# Patient Record
Sex: FEMALE | Race: WHITE | NOT HISPANIC OR LATINO | Employment: OTHER | URBAN - METROPOLITAN AREA
[De-identification: names, ages, dates, MRNs, and addresses within clinical notes are randomized per-mention and may not be internally consistent; named-entity substitution may affect disease eponyms.]

---

## 2018-05-04 ENCOUNTER — HOSPITAL ENCOUNTER (OUTPATIENT)
Facility: HOSPITAL | Age: 83
Discharge: HOME | End: 2018-05-04
Attending: INTERNAL MEDICINE | Admitting: INTERNAL MEDICINE
Payer: MEDICARE

## 2018-05-04 VITALS
TEMPERATURE: 98 F | SYSTOLIC BLOOD PRESSURE: 120 MMHG | RESPIRATION RATE: 17 BRPM | HEART RATE: 86 BPM | OXYGEN SATURATION: 88 % | DIASTOLIC BLOOD PRESSURE: 63 MMHG

## 2018-05-04 DIAGNOSIS — E87.1 HYPONATREMIA: ICD-10-CM

## 2018-05-04 DIAGNOSIS — R79.89 ELEVATED TROPONIN: Primary | ICD-10-CM

## 2018-05-04 PROBLEM — M54.6 ACUTE THORACIC BACK PAIN: Status: ACTIVE | Noted: 2018-05-04

## 2018-05-04 LAB
ALBUMIN SERPL BCP-MCNC: 3.9 G/DL
ALP SERPL-CCNC: 59 U/L
ALT SERPL W/O P-5'-P-CCNC: 10 U/L
ANION GAP SERPL CALC-SCNC: 11 MMOL/L
AST SERPL-CCNC: 20 U/L
BACTERIA #/AREA URNS HPF: ABNORMAL /HPF
BASOPHILS # BLD AUTO: 0.03 K/UL
BASOPHILS NFR BLD: 0.4 %
BILIRUB SERPL-MCNC: 0.8 MG/DL
BILIRUB UR QL STRIP: NEGATIVE
BUN SERPL-MCNC: 12 MG/DL
CALCIUM SERPL-MCNC: 9.5 MG/DL
CHLORIDE SERPL-SCNC: 100 MMOL/L
CLARITY UR: CLEAR
CO2 SERPL-SCNC: 23 MMOL/L
COLOR UR: YELLOW
CREAT SERPL-MCNC: 1.2 MG/DL
DIASTOLIC DYSFUNCTION: NO
DIFFERENTIAL METHOD: ABNORMAL
EOSINOPHIL # BLD AUTO: 0.2 K/UL
EOSINOPHIL NFR BLD: 2.1 %
ERYTHROCYTE [DISTWIDTH] IN BLOOD BY AUTOMATED COUNT: 13.8 %
EST. GFR  (AFRICAN AMERICAN): 47 ML/MIN/1.73 M^2
EST. GFR  (NON AFRICAN AMERICAN): 41 ML/MIN/1.73 M^2
ESTIMATED PA SYSTOLIC PRESSURE: 20.14
GLUCOSE SERPL-MCNC: 97 MG/DL
GLUCOSE UR QL STRIP: NEGATIVE
HCT VFR BLD AUTO: 37.3 %
HGB BLD-MCNC: 12.6 G/DL
HGB UR QL STRIP: ABNORMAL
HYALINE CASTS #/AREA URNS LPF: 0 /LPF
KETONES UR QL STRIP: NEGATIVE
LEUKOCYTE ESTERASE UR QL STRIP: NEGATIVE
LYMPHOCYTES # BLD AUTO: 1.3 K/UL
LYMPHOCYTES NFR BLD: 18.8 %
MCH RBC QN AUTO: 30.7 PG
MCHC RBC AUTO-ENTMCNC: 33.8 G/DL
MCV RBC AUTO: 91 FL
MICROSCOPIC COMMENT: ABNORMAL
MONOCYTES # BLD AUTO: 0.5 K/UL
MONOCYTES NFR BLD: 7.1 %
NEUTROPHILS # BLD AUTO: 5 K/UL
NEUTROPHILS NFR BLD: 71.6 %
NITRITE UR QL STRIP: NEGATIVE
PH UR STRIP: 7 [PH] (ref 5–8)
PLATELET # BLD AUTO: 171 K/UL
PMV BLD AUTO: 9 FL
POTASSIUM SERPL-SCNC: 4 MMOL/L
PROT SERPL-MCNC: 6.9 G/DL
PROT UR QL STRIP: ABNORMAL
RBC # BLD AUTO: 4.11 M/UL
RBC #/AREA URNS HPF: 1 /HPF (ref 0–4)
RETIRED EF AND QEF - SEE NOTES: 60 (ref 55–65)
SODIUM SERPL-SCNC: 134 MMOL/L
SP GR UR STRIP: 1.01 (ref 1–1.03)
TROPONIN I SERPL DL<=0.01 NG/ML-MCNC: 0.03 NG/ML
TROPONIN I SERPL DL<=0.01 NG/ML-MCNC: 0.04 NG/ML
URN SPEC COLLECT METH UR: ABNORMAL
UROBILINOGEN UR STRIP-ACNC: NEGATIVE EU/DL
WBC # BLD AUTO: 7.01 K/UL
WBC #/AREA URNS HPF: 1 /HPF (ref 0–5)

## 2018-05-04 PROCEDURE — 99285 EMERGENCY DEPT VISIT HI MDM: CPT | Mod: 25

## 2018-05-04 PROCEDURE — 93005 ELECTROCARDIOGRAM TRACING: CPT

## 2018-05-04 PROCEDURE — 63600175 PHARM REV CODE 636 W HCPCS: Performed by: PHYSICIAN ASSISTANT

## 2018-05-04 PROCEDURE — 93306 TTE W/DOPPLER COMPLETE: CPT

## 2018-05-04 PROCEDURE — G0378 HOSPITAL OBSERVATION PER HR: HCPCS

## 2018-05-04 PROCEDURE — 84484 ASSAY OF TROPONIN QUANT: CPT

## 2018-05-04 PROCEDURE — 96374 THER/PROPH/DIAG INJ IV PUSH: CPT | Mod: 59

## 2018-05-04 PROCEDURE — 81000 URINALYSIS NONAUTO W/SCOPE: CPT

## 2018-05-04 PROCEDURE — 93306 TTE W/DOPPLER COMPLETE: CPT | Mod: 26,,, | Performed by: INTERNAL MEDICINE

## 2018-05-04 PROCEDURE — 80053 COMPREHEN METABOLIC PANEL: CPT

## 2018-05-04 PROCEDURE — 85025 COMPLETE CBC W/AUTO DIFF WBC: CPT

## 2018-05-04 PROCEDURE — 99203 OFFICE O/P NEW LOW 30 MIN: CPT | Mod: ,,, | Performed by: INTERNAL MEDICINE

## 2018-05-04 PROCEDURE — 93010 ELECTROCARDIOGRAM REPORT: CPT | Mod: ,,, | Performed by: INTERNAL MEDICINE

## 2018-05-04 PROCEDURE — 25000003 PHARM REV CODE 250: Performed by: PHYSICIAN ASSISTANT

## 2018-05-04 RX ORDER — ONDANSETRON 8 MG/1
8 TABLET, ORALLY DISINTEGRATING ORAL EVERY 8 HOURS PRN
Status: DISCONTINUED | OUTPATIENT
Start: 2018-05-04 | End: 2018-05-04 | Stop reason: HOSPADM

## 2018-05-04 RX ORDER — SODIUM CHLORIDE 9 MG/ML
INJECTION, SOLUTION INTRAVENOUS CONTINUOUS
Status: DISCONTINUED | OUTPATIENT
Start: 2018-05-04 | End: 2018-05-04 | Stop reason: HOSPADM

## 2018-05-04 RX ORDER — ONDANSETRON 4 MG/1
4 TABLET, ORALLY DISINTEGRATING ORAL
Status: COMPLETED | OUTPATIENT
Start: 2018-05-04 | End: 2018-05-04

## 2018-05-04 RX ORDER — TRAMADOL HYDROCHLORIDE 50 MG/1
50 TABLET ORAL EVERY 6 HOURS PRN
Status: DISCONTINUED | OUTPATIENT
Start: 2018-05-04 | End: 2018-05-04 | Stop reason: HOSPADM

## 2018-05-04 RX ORDER — METOPROLOL TARTRATE 50 MG/1
50 TABLET ORAL
Status: DISCONTINUED | OUTPATIENT
Start: 2018-05-04 | End: 2018-05-04

## 2018-05-04 RX ORDER — TRAMADOL HYDROCHLORIDE 100 MG/1
1 CAPSULE ORAL 2 TIMES DAILY
COMMUNITY
End: 2018-06-11

## 2018-05-04 RX ORDER — ASPIRIN 325 MG
325 TABLET, DELAYED RELEASE (ENTERIC COATED) ORAL
Status: COMPLETED | OUTPATIENT
Start: 2018-05-04 | End: 2018-05-04

## 2018-05-04 RX ORDER — MORPHINE SULFATE 4 MG/ML
4 INJECTION, SOLUTION INTRAMUSCULAR; INTRAVENOUS
Status: COMPLETED | OUTPATIENT
Start: 2018-05-04 | End: 2018-05-04

## 2018-05-04 RX ADMIN — ONDANSETRON 4 MG: 4 TABLET, ORALLY DISINTEGRATING ORAL at 12:05

## 2018-05-04 RX ADMIN — ASPIRIN 325 MG: 325 TABLET, DELAYED RELEASE ORAL at 09:05

## 2018-05-04 RX ADMIN — MORPHINE SULFATE 4 MG: 4 INJECTION INTRAVENOUS at 12:05

## 2018-05-04 NOTE — CONSULTS
[facility]  Cardiology  Consult Note    Patient Name: Tessie Champion  MRN: [id number]  Admission Date: 5/4/2018  Hospital Length of Stay: 0 days  Code Status: No Order   Attending Provider: Magdiel Ferrell MD   Consulting Provider: Gogo Matthew PA-C  Primary Care Physician: Joe Collazo MD  Principal Problem:<principal problem not specified>    Patient information was obtained from patient and ER records.     Inpatient consult to Cardiology  Consult performed by: GOGO MATTHEW  Consult ordered by: TENISHA MORRIS        Subjective:     Chief Complaint:  Arm/back pain    HPI:   Ms. Champion is an 86 year old female patient with a PMHx of former tobacco abuse and HTN who presented to [facility] ED today with a chief complaint of right upper back pain and arm pain that began earlier this AM after bending down to  her pen off the floor. Patient reported the pain was severe and radiated from her neck down toward her hand. Associated symptoms included numbness and tingling of her right hand. Patient denied any associated chest pain, SOB, palpitations, near syncope, or syncope. Initial workup in ED revealed troponin of 0.031>0.037 and patient subsequently admitted for further evaluation and treatment/ACS rule out. Cardiology consulted to assist with management. Patient seen and examined today, resting in bed. Still reports right arm and upper back pain with movement but states it has improved. No other complaints. Denies any chest pain. No prior cardiac history. Patient reports compliance with her medications. Chart reviewed. Repeat troponin and 2D echo pending. EKG reviewed and showed non-specific T wave abnormality, no acute ischemic changes. CT of spine reviewed-age indeterminate fracture of T11. CXR negative.     Past Medical History:   Diagnosis Date    Hypertension        No past surgical history on file.    Review of patient's allergies indicates:   Allergen Reactions     Pcn [penicillins]     Sulfa (sulfonamide antibiotics)        No current facility-administered medications on file prior to encounter.      No current outpatient prescriptions on file prior to encounter.     Family History     None        Social History Main Topics    Smoking status: Not on file    Smokeless tobacco: Not on file    Alcohol use Not on file    Drug use: Unknown    Sexual activity: Not on file     Review of Systems   Constitution: Negative.   HENT: Negative.    Eyes: Negative.    Cardiovascular: Negative.    Respiratory: Negative.    Endocrine: Negative.    Hematologic/Lymphatic: Negative.    Skin: Negative.    Musculoskeletal: Positive for arthritis and joint pain.   Gastrointestinal: Negative.    Genitourinary: Negative.    Neurological: Negative.    Psychiatric/Behavioral: Negative.    Allergic/Immunologic: Negative.      Objective:     Vital Signs (Most Recent):  Temp: 98.1 °F (36.7 °C) (05/04/18 0808)  Pulse: 74 (05/04/18 1400)  Resp: 18 (05/04/18 1040)  BP: (!) 95/55 (05/04/18 1400)  SpO2: 99 % (05/04/18 1400) Vital Signs (24h Range):  Temp:  [98.1 °F (36.7 °C)] 98.1 °F (36.7 °C)  Pulse:  [59-79] 74  Resp:  [18] 18  SpO2:  [97 %-100 %] 99 %  BP: ()/(55-90) 95/55        There is no height or weight on file to calculate BMI.    SpO2: 99 %  O2 Device (Oxygen Therapy): room air    No intake or output data in the 24 hours ending 05/04/18 1501    Lines/Drains/Airways     Peripheral Intravenous Line                 Peripheral IV - Single Lumen 05/04/18 0832 Right Antecubital less than 1 day                Physical Exam   Constitutional: She is oriented to person, place, and time. She appears well-developed and well-nourished. No distress.   HENT:   Head: Normocephalic and atraumatic.   Eyes: Pupils are equal, round, and reactive to light. Right eye exhibits no discharge. Left eye exhibits no discharge.   Neck: Neck supple. No JVD present.   Cardiovascular: Normal rate, regular rhythm, S1  normal, S2 normal and normal heart sounds.    No murmur heard.  Pulmonary/Chest: Effort normal and breath sounds normal. No respiratory distress. She has no wheezes. She has no rales.   Abdominal: Soft. She exhibits no distension. There is no tenderness. There is no rebound.   Musculoskeletal: She exhibits tenderness (upper right back and right arm). She exhibits no edema.   Neurological: She is alert and oriented to person, place, and time.   Skin: Skin is warm and dry. She is not diaphoretic. No erythema.   Psychiatric: She has a normal mood and affect. Her behavior is normal. Thought content normal.   Nursing note and vitals reviewed.      Significant Labs:   CMP   Recent Labs  Lab 05/04/18  0832   *   K 4.0      CO2 23   GLU 97   BUN 12   CREATININE 1.2   CALCIUM 9.5   PROT 6.9   ALBUMIN 3.9   BILITOT 0.8   ALKPHOS 59   AST 20   ALT 10   ANIONGAP 11   ESTGFRAFRICA 47*   EGFRNONAA 41*   , CBC   Recent Labs  Lab 05/04/18  0832   WBC 7.01   HGB 12.6   HCT 37.3      , Troponin   Recent Labs  Lab 05/04/18  0832 05/04/18  1155   TROPONINI 0.031* 0.037*    and All pertinent lab results from the last 24 hours have been reviewed.    Significant Imaging: Echocardiogram: 2D echo with color flow doppler: No results found for this or any previous visit. and X-Ray: CXR: X-Ray Chest 1 View (CXR): No results found for this visit on 05/04/18. and X-Ray Chest PA and Lateral (CXR):   Results for orders placed or performed during the hospital encounter of 05/04/18   X-Ray Chest PA And Lateral    Narrative    EXAMINATION:  XR CHEST PA AND LATERAL    CLINICAL HISTORY:  Chest Pain;    COMPARISON:  None    FINDINGS:  There is mild cardiomegaly.  There is no evidence of an acute pulmonary process.  There is a moderate amount of atherosclerosis.  There is no pneumothorax or pleural effusion.  There is diffuse bony demineralization.  There are mild degenerative changes in the thoracic spine.      Impression    1. There  is no evidence of an acute pulmonary process.  2. There is mild cardiomegaly.  3. There is a moderate amount of atherosclerosis.  4. Osteopenia versus osteoporosis  5. There are mild degenerative changes in the thoracic spine.  .      Electronically signed by: Frankie Rodríguez MD  Date:    05/04/2018  Time:    08:51     Assessment and Plan:   Patient who presents with back and arm pain, MSK in nature, reproducible on exam. Unclear etiology of elevated troponin, however she is not having any active cardiac symptoms. Check 2D echo. If troponin and echo are stable, can f/u as OP.    Acute thoracic back pain    -Secondary to strain/injury  -Symptomatic relief        Elevated troponin    -Troponin 0.031>0.037  -Repeat pending  -Unclear etiology of elevation however patient is not having any angina or anginal equivalent  -Presentation consistent with MSK pain/strain  -EKG without any ischemic changes  -2D echo pending  -If echo negative and repeat troponin in similar range, can be d/c'd with OP f/u  -Can start ASA if no contraindications            VTE Risk Mitigation     None          Thank you for your consult. I will follow-up with patient. Please contact us if you have any additional questions.    Gogo Gallagher PA-C  Cardiology   [facility]    Chart reviewed. Dr. Woodall examined patient and agrees with plan as outlined above.

## 2018-05-04 NOTE — ASSESSMENT & PLAN NOTE
-Care Everywhere reviewed.  Patient has a history of lumbar disc disease. Was previously on Tramadol.   -CT reviewed. Shows osteoarthritic changes, compression fracture, and Osteophytes.   -MRI offered, but patient declined. Voiced concern for possible cord compression. Verbalized understanding, but again declined. Will consider MRI outpatient.   -pain management.

## 2018-05-04 NOTE — H&P
[facility] Hospital Medicine  History & Physical    Patient Name: Tessie Champion  MRN: [id number]  Admission Date: 5/4/2018  Attending Physician: Magdiel Ferrell MD   Primary Care Provider: Joe Collazo MD    Patient information was obtained from patient and ER records.     Subjective:     Principal Problem:<principal problem not specified>    Chief Complaint:   Chief Complaint   Patient presents with    Arm Pain     Reports reaching down and having R shoulder/arm pain with hand numbness that has since resolved.         HPI: Tessie Champion is a 86 year old female with history of Hypertension and lumbar back pain who presents to ED for further evaluation of right arm pain that acutely began today. Pain radiated to upper back pain(across scapula)and neck. Other associated symptoms include tingling involving 2 digits on right hand. Patient denies chest pain and dyspnea. CT Head was negative. CT Cervical and Thoracic spine showed osteoarthritis changes, compression fracture involving T11 and L1. MRI was recommended, but patient declined. Other work up revealed elevated Troponin 0.031>>0.037. EKG showed nonspecific ST changes.  She has subsequently been admitted to observation for further work up.        Past Medical History:   Diagnosis Date    Hypertension  Lumbar Disc Disease  Hypothyroidism  CKD III        Surgical History  Reviewed. Not pertinent.     Review of patient's allergies indicates:   Allergen Reactions    Pcn [penicillins]     Sulfa (sulfonamide antibiotics)        No current facility-administered medications on file prior to encounter.      No current outpatient prescriptions on file prior to encounter.     Family History     Reviewed. Not Pertinent.        Social History Main Topics    Smoking status: Not on file    Smokeless tobacco: Not on file    Alcohol use Not on file    Drug use: Unknown    Sexual activity: Not on file     Review of Systems   Constitutional:  Negative for activity change, diaphoresis, fatigue and unexpected weight change.   HENT: Negative for congestion, ear pain and sore throat.    Eyes: Negative.    Respiratory: Negative for shortness of breath and wheezing.    Cardiovascular: Negative for chest pain and palpitations.   Gastrointestinal: Negative for abdominal pain, constipation, diarrhea and vomiting.   Endocrine: Negative.    Genitourinary: Negative for flank pain, hematuria and urgency.   Musculoskeletal: Positive for back pain, myalgias and neck pain. Negative for joint swelling.   Skin: Negative for pallor.   Neurological: Positive for numbness. Negative for seizures, syncope and light-headedness.   Hematological: Negative.    Psychiatric/Behavioral: Negative.      Objective:     Vital Signs (Most Recent):  Temp: 98.1 °F (36.7 °C) (05/04/18 0808)  Pulse: 74 (05/04/18 1400)  Resp: 18 (05/04/18 1040)  BP: (!) 95/55 (05/04/18 1400)  SpO2: 99 % (05/04/18 1400) Vital Signs (24h Range):  Temp:  [98.1 °F (36.7 °C)] 98.1 °F (36.7 °C)  Pulse:  [59-79] 74  Resp:  [18] 18  SpO2:  [97 %-100 %] 99 %  BP: ()/(55-90) 95/55        There is no height or weight on file to calculate BMI.    Physical Exam   Constitutional: She is oriented to person, place, and time. She appears well-developed and well-nourished.   Elderly.    HENT:   Head: Normocephalic and atraumatic.   Eyes: EOM are normal.   Neck: Normal range of motion. Neck supple.   Cardiovascular: Normal rate, regular rhythm and normal heart sounds.    No murmur heard.  Pulmonary/Chest: Effort normal and breath sounds normal. No respiratory distress.   Abdominal: Soft. Bowel sounds are normal. She exhibits no distension. There is no tenderness.   Musculoskeletal: Normal range of motion. She exhibits no edema.   5/5 muscle strength.    Neurological: She is alert and oriented to person, place, and time.   Skin: Skin is warm and dry.         CRANIAL NERVES     CN III, IV, VI   Extraocular motions are  normal.        Significant Labs:   CBC:   Recent Labs  Lab 05/04/18  0832   WBC 7.01   HGB 12.6   HCT 37.3        CMP:   Recent Labs  Lab 05/04/18  0832   *   K 4.0      CO2 23   GLU 97   BUN 12   CREATININE 1.2   CALCIUM 9.5   PROT 6.9   ALBUMIN 3.9   BILITOT 0.8   ALKPHOS 59   AST 20   ALT 10   ANIONGAP 11   EGFRNONAA 41*       Significant Imaging:   Imaging Results          CT Thoracic Spine Without Contrast (Final result)  Result time 05/04/18 09:43:53    Final result by BRAYAN Rodríguez Sr., MD (05/04/18 09:43:53)                 Impression:      1. There is an age-indeterminate mild compression fracture of T11. There is an age indeterminate fracture of the L1 vertebral body.  2. There are small posterior osteophytes between T6 and T7. There are small posterior osteophytes between T9 and T10.  3. There is a marked amount of atherosclerosis.  4. If additional imaging evaluation is clinically indicated, I recommend consideration of an MRI examination of the thoracic spine without IV contrast.  All CT scans at this facility use dose modulation, iterative reconstruction, and/or weight base dosing when appropriate to reduce radiation dose when appropriate to reduce radiation dose to as low as reasonably achievable.      Electronically signed by: Frankie Rodríguez MD  Date:    05/04/2018  Time:    09:43             Narrative:    EXAMINATION:  CT THORACIC SPINE WITHOUT CONTRAST    CLINICAL HISTORY:  Mid-back/thoracic spine pain, first study;    TECHNIQUE:  Standard thoracic spine CT protocol was performed without IV contrast.    COMPARISON:  Plain film examination of the lumbar spine performed on 03/15/2010.    FINDINGS:  There is an age-indeterminate mild compression fracture of T11.  There is an age indeterminate fracture of the L1 vertebral body. There is no spondylolisthesis or scoliosis of the thoracic spine.  There is normal thoracic kyphosis.  There is a marked amount of atherosclerosis.  There  are small posterior osteophytes between T6 and T7. There are small posterior osteophytes between T9 and T10.                               CT CERVICAL SPINE WITHOUT CONTRAST (Final result)  Result time 05/04/18 09:33:06    Final result by BRAYAN Rodríguez Sr., MD (05/04/18 09:33:06)                 Impression:      1. There are severe osteoarthritic changes in the facet joints bilaterally between C2 and C5.  2. There are mild degenerative changes between C5 and C7.  3. There is grade 1 anterolisthesis of C3 on C4. There is grade 1 anterolisthesis of C4 on C5.  4. There is straightening of the normal cervical lordosis.  All CT scans at this facility use dose modulation, iterative reconstruction, and/or weight base dosing when appropriate to reduce radiation dose when appropriate to reduce radiation dose to as low as reasonably achievable.      Electronically signed by: Frankie Rodríguez MD  Date:    05/04/2018  Time:    09:33             Narrative:    EXAMINATION:  CT CERVICAL SPINE WITHOUT CONTRAST    CLINICAL HISTORY:  right hand numbness;    TECHNIQUE:  Standard cervical spine CT protocol was performed without IV contrast.    COMPARISON:  Head CT performed on 05/04/2018.    FINDINGS:  There is straightening of the normal cervical lordosis.  There is grade 1 anterolisthesis of C3 on C4.  There is grade 1 anterolisthesis of C4 on C5.  There is no fracture or scoliosis.  There are mild degenerative changes between C5 and C7.  There are severe osteoarthritic changes in the facet joints bilaterally between C2 and C5.  There is a moderate amount of atherosclerosis.                               CT Head Without Contrast (Final result)  Result time 05/04/18 09:26:56    Final result by BRAYAN Rodríguez Sr., MD (05/04/18 09:26:56)                 Impression:      1. There is no evidence of an acute ischemic event.  2. There is no intracranial hemorrhage.  3. There is mild partial opacification of the right ethmoidal and left  sphenoidal sinuses.  This is characteristic of sinusitis.  All CT scans at this facility use dose modulation, iterative reconstruction, and/or weight base dosing when appropriate to reduce radiation dose when appropriate to reduce radiation dose to as low as reasonably achievable.      Electronically signed by: Frankie Rodríguez MD  Date:    05/04/2018  Time:    09:26             Narrative:    EXAMINATION:  CT HEAD WITHOUT CONTRAST    CLINICAL HISTORY:  right hand numbness;    TECHNIQUE:  Standard brain CT protocol without IV contrast was performed.    COMPARISON:  None    FINDINGS:  There are moderate bilateral periventricular ischemic white matter changes.  There are chronic appearing ischemic changes in the basal ganglia.  There is no evidence of an acute ischemic event.  There is no intracranial hemorrhage.  There is no skull fracture.  There is mild partial opacification of the right ethmoidal and left sphenoidal sinuses.                               X-Ray Chest PA And Lateral (Final result)  Result time 05/04/18 08:51:22    Final result by BRAYAN Rodríguez Sr., MD (05/04/18 08:51:22)                 Impression:      1. There is no evidence of an acute pulmonary process.  2. There is mild cardiomegaly.  3. There is a moderate amount of atherosclerosis.  4. Osteopenia versus osteoporosis  5. There are mild degenerative changes in the thoracic spine.  .      Electronically signed by: Frankie Rodríguez MD  Date:    05/04/2018  Time:    08:51             Narrative:    EXAMINATION:  XR CHEST PA AND LATERAL    CLINICAL HISTORY:  Chest Pain;    COMPARISON:  None    FINDINGS:  There is mild cardiomegaly.  There is no evidence of an acute pulmonary process.  There is a moderate amount of atherosclerosis.  There is no pneumothorax or pleural effusion.  There is diffuse bony demineralization.  There are mild degenerative changes in the thoracic spine.                                  Assessment/Plan:     Acute thoracic back  pain    -Care Everywhere reviewed.  Patient has a history of lumbar disc disease. Was previously on Tramadol.   -CT reviewed. Shows osteoarthritic changes, compression fracture, and Osteophytes.   -MRI offered, but patient declined. Voiced concern for possible cord compression. Verbalized understanding, but again declined. Will consider MRI outpatient.   -pain management.         Elevated troponin    -No complaints of chest pain or anginal equivalent.   -baseline unknown.   -Cardiology input appreciated. No evidence of dissection as well.   -will follow Echocardiogram, if negative will discharge and follow up outpatient with Cardiology.   -agree with initiation of ASA.               VTE Risk Mitigation     Megan Ventura NP  Department of Hospital Medicine   [facility]

## 2018-05-04 NOTE — DISCHARGE SUMMARY
[facility] Hospital Medicine  Discharge Summary      Patient Name: Tessie Champion  MRN: [id number]  Admission Date: 5/4/2018  Hospital Length of Stay: 0 days  Discharge Date and Time:  05/04/2018 4:43 PM  Attending Physician: Magdiel Ferrell MD   Discharging Provider: Cheng Ventura NP  Primary Care Provider: Joe Collazo MD      HPI:   Tessie Champion is a 86 year old female with history of Hypertension and lumbar back pain who presents to ED for further evaluation of right arm pain that acutely began today. Pain radiated to upper back pain(across scapula)and neck. Other associated symptoms include tingling involving 2 digits on right hand. Patient denies chest pain and dyspnea. CT Head was negative. CT Cervical and Thoracic spine showed osteoarthritis changes, compression fracture involving T11 and L1. MRI was recommended, but patient declined. Other work up revealed elevated Troponin 0.031>>0.037. EKG showed nonspecific ST changes.  She has subsequently been admitted to observation for further work up.        * No surgery found *      Hospital Course:   Tessie Champion is a 86 year old female who was admitted to [facility] for further evaluation of right arm pain and back pain. CT shows osteoarthritic changes and compression changes involving vertebrae. MRI was recommended, but patient declined. She will consider MRI outpatient. She was asked to continue Tramadol prn for pain. She was noted with troponin elevation and was seen by Cardiology. She denied chest pain and other anginal equivalent. Echocardiogram did not show WMA or another abnormality. Etiology of troponin elevation unknown at this time. She will need to follow up with Cardiology in one week for full work up. (Appt made with Dr. Arce 5/8/2018). Patient seen and examined on date of discharge and deemed suitable.      Consults:   Consults         Status Ordering Provider     Inpatient consult to Cardiology   Once     Provider:  (Not yet assigned)    Completed TENISHA MORRIS new Assessment & Plan notes have been filed under this hospital service since the last note was generated.  Service: Hospital Medicine    Final Active Diagnoses:    Diagnosis Date Noted POA    PRINCIPAL PROBLEM:  Acute thoracic back pain [M54.6] 05/04/2018 Unknown    Elevated troponin [R74.8] 05/04/2018 Yes      Problems Resolved During this Admission:    Diagnosis Date Noted Date Resolved POA       Discharged Condition: stable    Disposition: Home or Self Care    Follow Up:  Follow-up Information     Nick Arce Md, MD On 5/8/2018.    Specialties:  Cardiology, Internal Medicine  Contact information:  [address]  [place] [address] [phone]                 Patient Instructions:   No discharge procedures on file.    Significant Diagnostic Studies: Labs:   CMP   Recent Labs  Lab 05/04/18  0832   *   K 4.0      CO2 23   GLU 97   BUN 12   CREATININE 1.2   CALCIUM 9.5   PROT 6.9   ALBUMIN 3.9   BILITOT 0.8   ALKPHOS 59   AST 20   ALT 10   ANIONGAP 11   ESTGFRAFRICA 47*   EGFRNONAA 41*    and CBC   Recent Labs  Lab 05/04/18  0832   WBC 7.01   HGB 12.6   HCT 37.3          Pending Diagnostic Studies:     None         Medications:  Reconciled Home Medications:      Medication List      CONTINUE taking these medications    traMADol 100 mg Mp25  Take by mouth.            Indwelling Lines/Drains at time of discharge:   Lines/Drains/Airways          No matching active lines, drains, or airways          Time spent on the discharge of patient: >#35 minutes  Patient was seen and examined on the date of discharge and determined to be suitable for discharge.      Cheng Ventura NP  Department of Hospital Medicine  [facility]

## 2018-05-04 NOTE — ED PROVIDER NOTES
Encounter Date: 5/4/2018       History     Chief Complaint   Patient presents with    Arm Pain     Reports reaching down and having R shoulder/arm pain with hand numbness that has since resolved.      The history is provided by the patient.   Arm Pain   This is a new problem. The current episode started 3 to 5 hours ago. The problem occurs rarely. The problem has been gradually improving. Pertinent negatives include no chest pain, no abdominal pain, no headaches and no shortness of breath. Associated symptoms comments: Right hand paresthesias  . Exacerbated by: movement and use of arm. Nothing relieves the symptoms. She has tried nothing for the symptoms.     Review of patient's allergies indicates:   Allergen Reactions    Pcn [penicillins]     Sulfa (sulfonamide antibiotics)      Past Medical History:   Diagnosis Date    Hypertension      No past surgical history on file.  History reviewed. No pertinent family history.  Social History   Substance Use Topics    Smoking status: Not on file    Smokeless tobacco: Not on file    Alcohol use Not on file     Review of Systems   Constitutional: Negative for chills and fever.   HENT: Negative for sore throat.    Eyes: Negative for photophobia and redness.   Respiratory: Negative for cough and shortness of breath.    Cardiovascular: Negative for chest pain.   Gastrointestinal: Negative for abdominal pain, diarrhea and nausea.   Endocrine: Negative for polydipsia and polyphagia.   Genitourinary: Negative for dysuria.   Musculoskeletal: Negative for arthralgias, back pain and myalgias.   Skin: Negative for rash.   Neurological: Negative for weakness and headaches.   Hematological: Does not bruise/bleed easily.   Psychiatric/Behavioral: The patient is not nervous/anxious.    All other systems reviewed and are negative.      Physical Exam     Initial Vitals [05/04/18 0808]   BP Pulse Resp Temp SpO2   (!) 119/90 (!) 59 18 98.1 °F (36.7 °C) 99 %      MAP       99.67          Physical Exam    Nursing note and vitals reviewed.  Constitutional: Vital signs are normal. She appears well-developed and well-nourished. No distress.   HENT:   Head: Normocephalic and atraumatic.   Right Ear: External ear normal.   Left Ear: External ear normal.   Nose: Nose normal.   Mouth/Throat: Oropharynx is clear and moist.   Eyes: Conjunctivae, EOM and lids are normal. Pupils are equal, round, and reactive to light.   Neck: Normal range of motion and full passive range of motion without pain. Neck supple.   Cardiovascular: Normal rate, regular rhythm, S1 normal, S2 normal, normal heart sounds, intact distal pulses and normal pulses.   Pulmonary/Chest: Breath sounds normal. No respiratory distress. She has no wheezes. She has no rales.   Abdominal: Soft. Normal appearance and bowel sounds are normal. She exhibits no distension. There is no tenderness.   Musculoskeletal: Normal range of motion.   Lymphadenopathy:     She has no cervical adenopathy.   Neurological: She is alert and oriented to person, place, and time. She has normal strength. No cranial nerve deficit or sensory deficit. Coordination and gait normal.   Skin: Skin is warm, dry and intact.   Psychiatric: She has a normal mood and affect. Her speech is normal and behavior is normal. Judgment and thought content normal. Cognition and memory are normal.         ED Course   Procedures  Labs Reviewed   CBC W/ AUTO DIFFERENTIAL - Abnormal; Notable for the following:        Result Value    MPV 9.0 (*)     All other components within normal limits   COMPREHENSIVE METABOLIC PANEL - Abnormal; Notable for the following:     Sodium 134 (*)     eGFR if  47 (*)     eGFR if non  41 (*)     All other components within normal limits   URINALYSIS - Abnormal; Notable for the following:     Protein, UA 1+ (*)     Occult Blood UA Trace (*)     All other components within normal limits   TROPONIN I - Abnormal; Notable for the  following:     Troponin I 0.031 (*)     All other components within normal limits   TROPONIN I - Abnormal; Notable for the following:     Troponin I 0.037 (*)     All other components within normal limits   URINALYSIS MICROSCOPIC - Abnormal; Notable for the following:     Bacteria, UA Few (*)     All other components within normal limits             Medical Decision Making:   Other:   I have discussed this case with another health care provider.       <> Summary of the Discussion: Discussed case with Dr. Woodall who asks that we consult Hospital medicine for admission to determine cause of troponin elevation.  Discussed case with Ashley (Hospital Medicine) who agrees to admit the pt.                      Clinical Impression:   The primary encounter diagnosis was Elevated troponin. A diagnosis of Hyponatremia was also pertinent to this visit.    Disposition:   Disposition: Admitted  Condition: Stable                        FABRIZIO Beltran  05/04/18 1316       FABRIZIO Beltran  05/04/18 1316

## 2018-05-04 NOTE — HPI
Ms. Champion is an 86 year old female patient with a PMHx of former tobacco abuse and HTN who presented to [facility] ED today with a chief complaint of right upper back pain and arm pain that began earlier this AM after bending down to  her pen off the floor. Patient reported the pain was severe and radiated from her neck down toward her hand. Associated symptoms included numbness and tingling of her right hand. Patient denied any associated chest pain, SOB, palpitations, near syncope, or syncope. Initial workup in ED revealed troponin of 0.031>0.037 and patient subsequently admitted for further evaluation and treatment/ACS rule out. Cardiology consulted to assist with management. Patient seen and examined today, resting in bed. Still reports right arm and upper back pain with movement but states it has improved. No other complaints. Denies any chest pain. No prior cardiac history. Patient reports compliance with her medications. Chart reviewed. Repeat troponin and 2D echo pending. EKG reviewed and showed non-specific T wave abnormality, no acute ischemic changes. CT of spine reviewed-age indeterminate fracture of T11. CXR negative.

## 2018-05-04 NOTE — SUBJECTIVE & OBJECTIVE
Past Medical History:   Diagnosis Date    Hypertension        No past surgical history on file.    Review of patient's allergies indicates:   Allergen Reactions    Pcn [penicillins]     Sulfa (sulfonamide antibiotics)        No current facility-administered medications on file prior to encounter.      No current outpatient prescriptions on file prior to encounter.     Family History     None        Social History Main Topics    Smoking status: Not on file    Smokeless tobacco: Not on file    Alcohol use Not on file    Drug use: Unknown    Sexual activity: Not on file     Review of Systems   Constitution: Negative.   HENT: Negative.    Eyes: Negative.    Cardiovascular: Negative.    Respiratory: Negative.    Endocrine: Negative.    Hematologic/Lymphatic: Negative.    Skin: Negative.    Musculoskeletal: Positive for arthritis and joint pain.   Gastrointestinal: Negative.    Genitourinary: Negative.    Neurological: Negative.    Psychiatric/Behavioral: Negative.    Allergic/Immunologic: Negative.      Objective:     Vital Signs (Most Recent):  Temp: 98.1 °F (36.7 °C) (05/04/18 0808)  Pulse: 74 (05/04/18 1400)  Resp: 18 (05/04/18 1040)  BP: (!) 95/55 (05/04/18 1400)  SpO2: 99 % (05/04/18 1400) Vital Signs (24h Range):  Temp:  [98.1 °F (36.7 °C)] 98.1 °F (36.7 °C)  Pulse:  [59-79] 74  Resp:  [18] 18  SpO2:  [97 %-100 %] 99 %  BP: ()/(55-90) 95/55        There is no height or weight on file to calculate BMI.    SpO2: 99 %  O2 Device (Oxygen Therapy): room air    No intake or output data in the 24 hours ending 05/04/18 1501    Lines/Drains/Airways     Peripheral Intravenous Line                 Peripheral IV - Single Lumen 05/04/18 0832 Right Antecubital less than 1 day                Physical Exam   Constitutional: She is oriented to person, place, and time. She appears well-developed and well-nourished. No distress.   HENT:   Head: Normocephalic and atraumatic.   Eyes: Pupils are equal, round, and reactive  to light. Right eye exhibits no discharge. Left eye exhibits no discharge.   Neck: Neck supple. No JVD present.   Cardiovascular: Normal rate, regular rhythm, S1 normal, S2 normal and normal heart sounds.    No murmur heard.  Pulmonary/Chest: Effort normal and breath sounds normal. No respiratory distress. She has no wheezes. She has no rales.   Abdominal: Soft. She exhibits no distension. There is no tenderness. There is no rebound.   Musculoskeletal: She exhibits tenderness (upper right back and right arm). She exhibits no edema.   Neurological: She is alert and oriented to person, place, and time.   Skin: Skin is warm and dry. She is not diaphoretic. No erythema.   Psychiatric: She has a normal mood and affect. Her behavior is normal. Thought content normal.   Nursing note and vitals reviewed.      Significant Labs:   CMP   Recent Labs  Lab 05/04/18  0832   *   K 4.0      CO2 23   GLU 97   BUN 12   CREATININE 1.2   CALCIUM 9.5   PROT 6.9   ALBUMIN 3.9   BILITOT 0.8   ALKPHOS 59   AST 20   ALT 10   ANIONGAP 11   ESTGFRAFRICA 47*   EGFRNONAA 41*   , CBC   Recent Labs  Lab 05/04/18  0832   WBC 7.01   HGB 12.6   HCT 37.3      , Troponin   Recent Labs  Lab 05/04/18  0832 05/04/18  1155   TROPONINI 0.031* 0.037*    and All pertinent lab results from the last 24 hours have been reviewed.    Significant Imaging: Echocardiogram: 2D echo with color flow doppler: No results found for this or any previous visit. and X-Ray: CXR: X-Ray Chest 1 View (CXR): No results found for this visit on 05/04/18. and X-Ray Chest PA and Lateral (CXR):   Results for orders placed or performed during the hospital encounter of 05/04/18   X-Ray Chest PA And Lateral    Narrative    EXAMINATION:  XR CHEST PA AND LATERAL    CLINICAL HISTORY:  Chest Pain;    COMPARISON:  None    FINDINGS:  There is mild cardiomegaly.  There is no evidence of an acute pulmonary process.  There is a moderate amount of atherosclerosis.  There is no  pneumothorax or pleural effusion.  There is diffuse bony demineralization.  There are mild degenerative changes in the thoracic spine.      Impression    1. There is no evidence of an acute pulmonary process.  2. There is mild cardiomegaly.  3. There is a moderate amount of atherosclerosis.  4. Osteopenia versus osteoporosis  5. There are mild degenerative changes in the thoracic spine.  .      Electronically signed by: Frankie Rodríguez MD  Date:    05/04/2018  Time:    08:51

## 2018-05-04 NOTE — HPI
Tessie Champion is a 86 year old female with history of Hypertension and lumbar back pain who presents to ED for further evaluation of right arm pain that acutely began today. Pain radiated to upper back pain(across scapula)and neck. Other associated symptoms include tingling involving 2 digits on right hand. Patient denies chest pain and dyspnea. CT Head was negative. CT Cervical and Thoracic spine showed osteoarthritis changes, compression fracture involving T11 and L1. MRI was recommended, but patient declined. Other work up revealed elevated Troponin 0.031>>0.037. EKG showed nonspecific ST changes.  She has subsequently been admitted to observation for further work up.

## 2018-05-04 NOTE — SUBJECTIVE & OBJECTIVE
Past Medical History:   Diagnosis Date    Hypertension  Lumbar Disc Disease  Hypothyroidism  CKD III        Surgical History  Reviewed. Not pertinent.     Review of patient's allergies indicates:   Allergen Reactions    Pcn [penicillins]     Sulfa (sulfonamide antibiotics)        No current facility-administered medications on file prior to encounter.      No current outpatient prescriptions on file prior to encounter.     Family History     Reviewed. Not Pertinent.        Social History Main Topics    Smoking status: Not on file    Smokeless tobacco: Not on file    Alcohol use Not on file    Drug use: Unknown    Sexual activity: Not on file     Review of Systems   Constitutional: Negative for activity change, diaphoresis, fatigue and unexpected weight change.   HENT: Negative for congestion, ear pain and sore throat.    Eyes: Negative.    Respiratory: Negative for shortness of breath and wheezing.    Cardiovascular: Negative for chest pain and palpitations.   Gastrointestinal: Negative for abdominal pain, constipation, diarrhea and vomiting.   Endocrine: Negative.    Genitourinary: Negative for flank pain, hematuria and urgency.   Musculoskeletal: Positive for back pain, myalgias and neck pain. Negative for joint swelling.   Skin: Negative for pallor.   Neurological: Positive for numbness. Negative for seizures, syncope and light-headedness.   Hematological: Negative.    Psychiatric/Behavioral: Negative.      Objective:     Vital Signs (Most Recent):  Temp: 98.1 °F (36.7 °C) (05/04/18 0808)  Pulse: 74 (05/04/18 1400)  Resp: 18 (05/04/18 1040)  BP: (!) 95/55 (05/04/18 1400)  SpO2: 99 % (05/04/18 1400) Vital Signs (24h Range):  Temp:  [98.1 °F (36.7 °C)] 98.1 °F (36.7 °C)  Pulse:  [59-79] 74  Resp:  [18] 18  SpO2:  [97 %-100 %] 99 %  BP: ()/(55-90) 95/55        There is no height or weight on file to calculate BMI.    Physical Exam   Constitutional: She is oriented to person, place, and time. She  appears well-developed and well-nourished.   Elderly.    HENT:   Head: Normocephalic and atraumatic.   Eyes: EOM are normal.   Neck: Normal range of motion. Neck supple.   Cardiovascular: Normal rate, regular rhythm and normal heart sounds.    No murmur heard.  Pulmonary/Chest: Effort normal and breath sounds normal. No respiratory distress.   Abdominal: Soft. Bowel sounds are normal. She exhibits no distension. There is no tenderness.   Musculoskeletal: Normal range of motion. She exhibits no edema.   5/5 muscle strength.    Neurological: She is alert and oriented to person, place, and time.   Skin: Skin is warm and dry.         CRANIAL NERVES     CN III, IV, VI   Extraocular motions are normal.        Significant Labs:   CBC:   Recent Labs  Lab 05/04/18  0832   WBC 7.01   HGB 12.6   HCT 37.3        CMP:   Recent Labs  Lab 05/04/18  0832   *   K 4.0      CO2 23   GLU 97   BUN 12   CREATININE 1.2   CALCIUM 9.5   PROT 6.9   ALBUMIN 3.9   BILITOT 0.8   ALKPHOS 59   AST 20   ALT 10   ANIONGAP 11   EGFRNONAA 41*       Significant Imaging:   Imaging Results          CT Thoracic Spine Without Contrast (Final result)  Result time 05/04/18 09:43:53    Final result by BRAYAN Rodríguez Sr., MD (05/04/18 09:43:53)                 Impression:      1. There is an age-indeterminate mild compression fracture of T11. There is an age indeterminate fracture of the L1 vertebral body.  2. There are small posterior osteophytes between T6 and T7. There are small posterior osteophytes between T9 and T10.  3. There is a marked amount of atherosclerosis.  4. If additional imaging evaluation is clinically indicated, I recommend consideration of an MRI examination of the thoracic spine without IV contrast.  All CT scans at this facility use dose modulation, iterative reconstruction, and/or weight base dosing when appropriate to reduce radiation dose when appropriate to reduce radiation dose to as low as reasonably  achievable.      Electronically signed by: Frankie Rodríguez MD  Date:    05/04/2018  Time:    09:43             Narrative:    EXAMINATION:  CT THORACIC SPINE WITHOUT CONTRAST    CLINICAL HISTORY:  Mid-back/thoracic spine pain, first study;    TECHNIQUE:  Standard thoracic spine CT protocol was performed without IV contrast.    COMPARISON:  Plain film examination of the lumbar spine performed on 03/15/2010.    FINDINGS:  There is an age-indeterminate mild compression fracture of T11.  There is an age indeterminate fracture of the L1 vertebral body. There is no spondylolisthesis or scoliosis of the thoracic spine.  There is normal thoracic kyphosis.  There is a marked amount of atherosclerosis.  There are small posterior osteophytes between T6 and T7. There are small posterior osteophytes between T9 and T10.                               CT CERVICAL SPINE WITHOUT CONTRAST (Final result)  Result time 05/04/18 09:33:06    Final result by BRAYAN Rodríguez Sr., MD (05/04/18 09:33:06)                 Impression:      1. There are severe osteoarthritic changes in the facet joints bilaterally between C2 and C5.  2. There are mild degenerative changes between C5 and C7.  3. There is grade 1 anterolisthesis of C3 on C4. There is grade 1 anterolisthesis of C4 on C5.  4. There is straightening of the normal cervical lordosis.  All CT scans at this facility use dose modulation, iterative reconstruction, and/or weight base dosing when appropriate to reduce radiation dose when appropriate to reduce radiation dose to as low as reasonably achievable.      Electronically signed by: Frankie Rodríguez MD  Date:    05/04/2018  Time:    09:33             Narrative:    EXAMINATION:  CT CERVICAL SPINE WITHOUT CONTRAST    CLINICAL HISTORY:  right hand numbness;    TECHNIQUE:  Standard cervical spine CT protocol was performed without IV contrast.    COMPARISON:  Head CT performed on 05/04/2018.    FINDINGS:  There is straightening of the normal  cervical lordosis.  There is grade 1 anterolisthesis of C3 on C4.  There is grade 1 anterolisthesis of C4 on C5.  There is no fracture or scoliosis.  There are mild degenerative changes between C5 and C7.  There are severe osteoarthritic changes in the facet joints bilaterally between C2 and C5.  There is a moderate amount of atherosclerosis.                               CT Head Without Contrast (Final result)  Result time 05/04/18 09:26:56    Final result by BRAYAN Rodríguez Sr., MD (05/04/18 09:26:56)                 Impression:      1. There is no evidence of an acute ischemic event.  2. There is no intracranial hemorrhage.  3. There is mild partial opacification of the right ethmoidal and left sphenoidal sinuses.  This is characteristic of sinusitis.  All CT scans at this facility use dose modulation, iterative reconstruction, and/or weight base dosing when appropriate to reduce radiation dose when appropriate to reduce radiation dose to as low as reasonably achievable.      Electronically signed by: Frankie Rodríguez MD  Date:    05/04/2018  Time:    09:26             Narrative:    EXAMINATION:  CT HEAD WITHOUT CONTRAST    CLINICAL HISTORY:  right hand numbness;    TECHNIQUE:  Standard brain CT protocol without IV contrast was performed.    COMPARISON:  None    FINDINGS:  There are moderate bilateral periventricular ischemic white matter changes.  There are chronic appearing ischemic changes in the basal ganglia.  There is no evidence of an acute ischemic event.  There is no intracranial hemorrhage.  There is no skull fracture.  There is mild partial opacification of the right ethmoidal and left sphenoidal sinuses.                               X-Ray Chest PA And Lateral (Final result)  Result time 05/04/18 08:51:22    Final result by BRAYAN Rodríguez Sr., MD (05/04/18 08:51:22)                 Impression:      1. There is no evidence of an acute pulmonary process.  2. There is mild cardiomegaly.  3. There is a  moderate amount of atherosclerosis.  4. Osteopenia versus osteoporosis  5. There are mild degenerative changes in the thoracic spine.  .      Electronically signed by: Frankie Rodríguez MD  Date:    05/04/2018  Time:    08:51             Narrative:    EXAMINATION:  XR CHEST PA AND LATERAL    CLINICAL HISTORY:  Chest Pain;    COMPARISON:  None    FINDINGS:  There is mild cardiomegaly.  There is no evidence of an acute pulmonary process.  There is a moderate amount of atherosclerosis.  There is no pneumothorax or pleural effusion.  There is diffuse bony demineralization.  There are mild degenerative changes in the thoracic spine.

## 2018-05-04 NOTE — HOSPITAL COURSE
Tessie Champion is a 86 year old female who was admitted to [facility] for further evaluation of right arm pain and back pain. CT shows osteoarthritic changes and compression changes involving vertebrae. MRI was recommended, but patient declined. She will consider MRI outpatient. She was asked to continue Tramadol prn for pain. She was noted with troponin elevation and was seen by Cardiology. She denied chest pain and other anginal equivalent. Echocardiogram did not show WMA or another abnormality. Etiology of troponin elevation unknown at this time. She will need to follow up with Cardiology in one week for full work up. (Appt made with Dr. Arce 5/8/2018). Patient seen and examined on date of discharge and deemed suitable.

## 2018-05-04 NOTE — ASSESSMENT & PLAN NOTE
-No complaints of chest pain or anginal equivalent.   -baseline unknown.   -Cardiology input appreciated. No evidence of dissection as well.   -will follow Echocardiogram, if negative will discharge and follow up outpatient with Cardiology.   -agree with initiation of ASA.

## 2018-05-04 NOTE — ASSESSMENT & PLAN NOTE
-Troponin 0.031>0.037  -Repeat pending  -Unclear etiology of elevation however patient is not having any angina or anginal equivalent  -Presentation consistent with MSK pain/strain  -EKG without any ischemic changes  -2D echo pending  -If echo negative and repeat troponin in similar range, can be d/c'd with OP f/u  -Can start ASA if no contraindications

## 2018-06-11 ENCOUNTER — OFFICE VISIT (OUTPATIENT)
Dept: CARDIOLOGY | Facility: CLINIC | Age: 83
End: 2018-06-11
Payer: MEDICARE

## 2018-06-11 VITALS
HEART RATE: 72 BPM | WEIGHT: 121.5 LBS | HEIGHT: 62 IN | SYSTOLIC BLOOD PRESSURE: 125 MMHG | BODY MASS INDEX: 22.36 KG/M2 | DIASTOLIC BLOOD PRESSURE: 85 MMHG

## 2018-06-11 DIAGNOSIS — R07.9 ACUTE CHEST PAIN: ICD-10-CM

## 2018-06-11 DIAGNOSIS — R79.89 ELEVATED TROPONIN: Primary | ICD-10-CM

## 2018-06-11 DIAGNOSIS — M54.6 ACUTE THORACIC BACK PAIN, UNSPECIFIED BACK PAIN LATERALITY: ICD-10-CM

## 2018-06-11 DIAGNOSIS — I10 ESSENTIAL HYPERTENSION: ICD-10-CM

## 2018-06-11 PROCEDURE — 99999 PR PBB SHADOW E&M-EST. PATIENT-LVL III: CPT | Mod: PBBFAC,,, | Performed by: INTERNAL MEDICINE

## 2018-06-11 PROCEDURE — 99215 OFFICE O/P EST HI 40 MIN: CPT | Mod: S$PBB,,, | Performed by: INTERNAL MEDICINE

## 2018-06-11 PROCEDURE — 99213 OFFICE O/P EST LOW 20 MIN: CPT | Mod: PBBFAC | Performed by: INTERNAL MEDICINE

## 2018-06-11 RX ORDER — TRAMADOL HYDROCHLORIDE 100 MG/1
100 TABLET, EXTENDED RELEASE ORAL 2 TIMES DAILY
Status: ON HOLD | COMMUNITY
End: 2022-02-09 | Stop reason: HOSPADM

## 2018-06-11 RX ORDER — LISINOPRIL 10 MG/1
10 TABLET ORAL DAILY
Qty: 90 TABLET | Refills: 3 | Status: SHIPPED | OUTPATIENT
Start: 2018-06-11 | End: 2020-02-20

## 2018-06-11 RX ORDER — LISINOPRIL 5 MG/1
10 TABLET ORAL DAILY
Qty: 180 TABLET | Refills: 3 | Status: SHIPPED | OUTPATIENT
Start: 2018-06-11 | End: 2018-06-11 | Stop reason: SDUPTHER

## 2018-06-11 NOTE — PROGRESS NOTES
Subjective:   Patient ID:  Tessie Champion is a 86 y.o. female who presents for cardiac consult of Hospital Follow Up      HPI  The patient came in today for cardiac consult of Hospital Follow Up      6/11/18  This is an 86 year old female with history of Hypertension, hypothyroidism, CKD, lumbar back pain who presents to ED for further evaluation of right arm pain that acutely on 5/4/18. Pain radiated to upper back pain(across scapula)and neck. Other associated symptoms include tingling involving 2 digits on right hand.  CT Head was negative. CT Cervical and Thoracic spine showed osteoarthritis changes, compression fracture involving T11 and L1. MRI was recommended, but patient declined. Other work up revealed elevated Troponin 0.031>>0.037. EKG showed nonspecific ST changes. She denied chest pain and other anginal equivalent. Echocardiogram did not show WMA or another abnormality.     Patient feels no sob, no leg swelling, no PND, no palpitation, no dizziness, no syncope, no CNS symptoms.    Patient has fairly good exercise tolerance.    Patient is compliant with medications.    2D ECHO CONCLUSIONS     1 - No wall motion abnormalities.     2 - Normal left ventricular systolic function (EF 60-65%).     3 - Normal left ventricular diastolic function.     4 - Normal right ventricular systolic function .     5 - The estimated PA systolic pressure is 20 mmHg.     This document has been electronically    SIGNED BY: Narinder Woodall MD On: 05/04/2018 15:32  Past Medical History:   Diagnosis Date    Hypertension        History reviewed. No pertinent surgical history.    Social History   Substance Use Topics    Smoking status: Former Smoker     Start date: 6/11/1973     Quit date: 6/11/1998    Smokeless tobacco: Not on file    Alcohol use 0.6 oz/week     1 Cans of beer per week       History reviewed. No pertinent family history.    Patient's Medications   New Prescriptions    LISINOPRIL 10 MG TABLET    Take 1 tablet (10  "mg total) by mouth once daily.   Previous Medications    LEVOTHYROXINE SODIUM (LEVOTHYROXINE ORAL)    Take 1 tablet by mouth once daily.    TRAMADOL (ULTRAM-ER) 100 MG TB24    Take 100 mg by mouth 2 (two) times daily.   Modified Medications    No medications on file   Discontinued Medications    TRAMADOL 100 MG MP25    Take 1 tablet by mouth 2 (two) times daily.        Review of Systems   Constitutional: Negative.    HENT: Negative.    Eyes: Negative.    Respiratory: Negative.    Cardiovascular: Positive for chest pain (atypical/ back).   Gastrointestinal: Negative.    Genitourinary: Negative.    Musculoskeletal: Positive for back pain and joint pain.   Skin: Negative.    Neurological: Negative.    Endo/Heme/Allergies: Negative.    Psychiatric/Behavioral: Negative.    All 12 systems otherwise negative.      Wt Readings from Last 3 Encounters:   06/11/18 55.1 kg (121 lb 7.6 oz)     Temp Readings from Last 3 Encounters:   05/04/18 98.1 °F (36.7 °C) (Oral)     BP Readings from Last 3 Encounters:   06/11/18 125/85   05/04/18 120/63     Pulse Readings from Last 3 Encounters:   06/11/18 72   05/04/18 86       /85 (BP Method: Small (Manual))   Pulse 72   Ht 5' 2" (1.575 m)   Wt 55.1 kg (121 lb 7.6 oz)   BMI 22.22 kg/m²     Objective:   Physical Exam   Constitutional: She is oriented to person, place, and time. She appears well-developed and well-nourished. No distress.   HENT:   Head: Normocephalic and atraumatic.   Nose: Nose normal.   Mouth/Throat: Oropharynx is clear and moist.   Eyes: Conjunctivae and EOM are normal. No scleral icterus.   Neck: Normal range of motion. Neck supple. No JVD present. No thyromegaly present.   Cardiovascular: Normal rate, regular rhythm, S1 normal and S2 normal.  Exam reveals no gallop, no S3, no S4 and no friction rub.    Murmur heard.  Pulmonary/Chest: Effort normal and breath sounds normal. No stridor. No respiratory distress. She has no wheezes. She has no rales. She exhibits " no tenderness.   Abdominal: Soft. Bowel sounds are normal. She exhibits no distension and no mass. There is no tenderness. There is no rebound.   Genitourinary:   Genitourinary Comments: Deferred   Musculoskeletal: Normal range of motion. She exhibits no edema, tenderness or deformity.   Lymphadenopathy:     She has no cervical adenopathy.   Neurological: She is alert and oriented to person, place, and time. She exhibits normal muscle tone. Coordination normal.   Skin: Skin is warm and dry. No rash noted. She is not diaphoretic. No erythema. No pallor.   Psychiatric: She has a normal mood and affect. Her behavior is normal. Judgment and thought content normal.   Nursing note and vitals reviewed.      Lab Results   Component Value Date     (L) 05/04/2018    K 4.0 05/04/2018     05/04/2018    CO2 23 05/04/2018    BUN 12 05/04/2018    CREATININE 1.2 05/04/2018    GLU 97 05/04/2018    AST 20 05/04/2018    ALT 10 05/04/2018    ALBUMIN 3.9 05/04/2018    PROT 6.9 05/04/2018    BILITOT 0.8 05/04/2018    WBC 7.01 05/04/2018    HGB 12.6 05/04/2018    HCT 37.3 05/04/2018    MCV 91 05/04/2018     05/04/2018     Assessment:      1. Elevated troponin    2. Acute thoracic back pain, unspecified back pain laterality    3. Acute chest pain    4. Essential hypertension        Plan:   1. Elevated troponin with atypical chest/referred pain  - r/o ischemia with nuclear pharm stress test; pt cannot walk due to joint/back pain    2. HTN  - cont meds    Thank you for allowing me to participate in this patient's care. Please do not hesitate to contact me with any questions or concerns. Consult note has been forwarded to the referral physician.

## 2018-06-19 ENCOUNTER — HOSPITAL ENCOUNTER (OUTPATIENT)
Dept: RADIOLOGY | Facility: HOSPITAL | Age: 83
Discharge: HOME | End: 2018-06-19
Attending: INTERNAL MEDICINE
Payer: MEDICARE

## 2018-06-19 ENCOUNTER — HOSPITAL ENCOUNTER (OUTPATIENT)
Dept: PULMONOLOGY | Facility: HOSPITAL | Age: 83
Discharge: HOME | End: 2018-06-19
Attending: INTERNAL MEDICINE
Payer: MEDICARE

## 2018-06-19 DIAGNOSIS — M54.6 ACUTE THORACIC BACK PAIN, UNSPECIFIED BACK PAIN LATERALITY: ICD-10-CM

## 2018-06-19 DIAGNOSIS — R79.89 ELEVATED TROPONIN: ICD-10-CM

## 2018-06-19 DIAGNOSIS — R07.9 ACUTE CHEST PAIN: ICD-10-CM

## 2018-06-19 LAB — DIASTOLIC DYSFUNCTION: NO

## 2018-06-19 PROCEDURE — A9502 TC99M TETROFOSMIN: HCPCS

## 2018-06-19 PROCEDURE — 78452 HT MUSCLE IMAGE SPECT MULT: CPT | Mod: 26,,, | Performed by: INTERNAL MEDICINE

## 2018-06-19 PROCEDURE — 93018 CV STRESS TEST I&R ONLY: CPT | Mod: ,,, | Performed by: INTERNAL MEDICINE

## 2018-06-19 PROCEDURE — 93016 CV STRESS TEST SUPVJ ONLY: CPT | Mod: ,,, | Performed by: INTERNAL MEDICINE

## 2018-06-19 PROCEDURE — 63600175 PHARM REV CODE 636 W HCPCS: Performed by: NURSE PRACTITIONER

## 2018-06-19 PROCEDURE — 93017 CV STRESS TEST TRACING ONLY: CPT

## 2018-06-19 RX ORDER — REGADENOSON 0.08 MG/ML
0.4 INJECTION, SOLUTION INTRAVENOUS ONCE
Status: COMPLETED | OUTPATIENT
Start: 2018-06-19 | End: 2018-06-19

## 2018-06-19 RX ADMIN — REGADENOSON 0.4 MG: 0.08 INJECTION, SOLUTION INTRAVENOUS at 02:06

## 2018-06-22 ENCOUNTER — TELEPHONE (OUTPATIENT)
Dept: CARDIOLOGY | Facility: CLINIC | Age: 83
End: 2018-06-22

## 2018-06-22 NOTE — TELEPHONE ENCOUNTER
Called to patient to give results of nuclear stress test--left voice message to call our office back at [phone].

## 2018-06-25 ENCOUNTER — TELEPHONE (OUTPATIENT)
Dept: CARDIOLOGY | Facility: CLINIC | Age: 83
End: 2018-06-25

## 2019-04-24 ENCOUNTER — TELEPHONE (OUTPATIENT)
Dept: CARDIOLOGY | Facility: CLINIC | Age: 84
End: 2019-04-24

## 2019-04-24 NOTE — TELEPHONE ENCOUNTER
Patient daughter called stating her mother received a letter in the mail stating she needs to make an appointment.Per her daughter Deborah she said her mother is refusing to schedule another appointment because of old age.       EDUARDO

## 2020-01-08 ENCOUNTER — HOSPITAL ENCOUNTER (EMERGENCY)
Facility: HOSPITAL | Age: 85
Discharge: HOME | End: 2020-01-08
Attending: EMERGENCY MEDICINE
Payer: MEDICARE

## 2020-01-08 VITALS
RESPIRATION RATE: 18 BRPM | SYSTOLIC BLOOD PRESSURE: 117 MMHG | DIASTOLIC BLOOD PRESSURE: 72 MMHG | OXYGEN SATURATION: 98 % | HEART RATE: 84 BPM | TEMPERATURE: 98 F

## 2020-01-08 DIAGNOSIS — E16.2 HYPOGLYCEMIA: ICD-10-CM

## 2020-01-08 DIAGNOSIS — R55 NEAR SYNCOPE: ICD-10-CM

## 2020-01-08 DIAGNOSIS — I95.9 HYPOTENSION, UNSPECIFIED HYPOTENSION TYPE: Primary | ICD-10-CM

## 2020-01-08 DIAGNOSIS — E86.0 DEHYDRATION: ICD-10-CM

## 2020-01-08 DIAGNOSIS — N28.9 RENAL INSUFFICIENCY: ICD-10-CM

## 2020-01-08 LAB
ALBUMIN SERPL BCP-MCNC: 4.5 G/DL (ref 3.5–5.2)
ALP SERPL-CCNC: 60 U/L (ref 55–135)
ALT SERPL W/O P-5'-P-CCNC: 15 U/L (ref 10–44)
ANION GAP SERPL CALC-SCNC: 15 MMOL/L (ref 8–16)
AST SERPL-CCNC: 29 U/L (ref 10–40)
BASOPHILS # BLD AUTO: 0.03 K/UL (ref 0–0.2)
BASOPHILS NFR BLD: 0.4 % (ref 0–1.9)
BILIRUB SERPL-MCNC: 0.9 MG/DL (ref 0.1–1)
BILIRUB UR QL STRIP: NEGATIVE
BUN SERPL-MCNC: 17 MG/DL (ref 8–23)
CALCIUM SERPL-MCNC: 10.1 MG/DL (ref 8.7–10.5)
CHLORIDE SERPL-SCNC: 94 MMOL/L (ref 95–110)
CLARITY UR: CLEAR
CO2 SERPL-SCNC: 22 MMOL/L (ref 23–29)
COLOR UR: YELLOW
CREAT SERPL-MCNC: 1.6 MG/DL (ref 0.5–1.4)
DIFFERENTIAL METHOD: ABNORMAL
EOSINOPHIL # BLD AUTO: 0 K/UL (ref 0–0.5)
EOSINOPHIL NFR BLD: 0.4 % (ref 0–8)
ERYTHROCYTE [DISTWIDTH] IN BLOOD BY AUTOMATED COUNT: 12.6 % (ref 11.5–14.5)
EST. GFR  (AFRICAN AMERICAN): 33 ML/MIN/1.73 M^2
EST. GFR  (NON AFRICAN AMERICAN): 29 ML/MIN/1.73 M^2
GLUCOSE SERPL-MCNC: 106 MG/DL (ref 70–110)
GLUCOSE UR QL STRIP: NEGATIVE
HCT VFR BLD AUTO: 41.9 % (ref 37–48.5)
HGB BLD-MCNC: 13.9 G/DL (ref 12–16)
HGB UR QL STRIP: ABNORMAL
IMM GRANULOCYTES # BLD AUTO: 0.04 K/UL (ref 0–0.04)
IMM GRANULOCYTES NFR BLD AUTO: 0.6 % (ref 0–0.5)
KETONES UR QL STRIP: NEGATIVE
LEUKOCYTE ESTERASE UR QL STRIP: NEGATIVE
LYMPHOCYTES # BLD AUTO: 0.9 K/UL (ref 1–4.8)
LYMPHOCYTES NFR BLD: 13.6 % (ref 18–48)
MAGNESIUM SERPL-MCNC: 2 MG/DL (ref 1.6–2.6)
MCH RBC QN AUTO: 30.2 PG (ref 27–31)
MCHC RBC AUTO-ENTMCNC: 33.2 G/DL (ref 32–36)
MCV RBC AUTO: 91 FL (ref 82–98)
MONOCYTES # BLD AUTO: 0.6 K/UL (ref 0.3–1)
MONOCYTES NFR BLD: 8.8 % (ref 4–15)
NEUTROPHILS # BLD AUTO: 5.3 K/UL (ref 1.8–7.7)
NEUTROPHILS NFR BLD: 76.2 % (ref 38–73)
NITRITE UR QL STRIP: NEGATIVE
NRBC BLD-RTO: 0 /100 WBC
PH UR STRIP: 6 [PH] (ref 5–8)
PLATELET # BLD AUTO: 173 K/UL (ref 150–350)
PMV BLD AUTO: 9.5 FL (ref 9.2–12.9)
POCT GLUCOSE: 105 MG/DL (ref 70–110)
POCT GLUCOSE: 68 MG/DL (ref 70–110)
POCT GLUCOSE: 95 MG/DL (ref 70–110)
POCT GLUCOSE: 95 MG/DL (ref 70–110)
POTASSIUM SERPL-SCNC: 4.5 MMOL/L (ref 3.5–5.1)
PROT SERPL-MCNC: 7.8 G/DL (ref 6–8.4)
PROT UR QL STRIP: NEGATIVE
RBC # BLD AUTO: 4.61 M/UL (ref 4–5.4)
SODIUM SERPL-SCNC: 131 MMOL/L (ref 136–145)
SP GR UR STRIP: 1.01 (ref 1–1.03)
TROPONIN I SERPL DL<=0.01 NG/ML-MCNC: 0.06 NG/ML (ref 0–0.03)
TROPONIN I SERPL DL<=0.01 NG/ML-MCNC: 0.07 NG/ML (ref 0–0.03)
URN SPEC COLLECT METH UR: ABNORMAL
UROBILINOGEN UR STRIP-ACNC: NEGATIVE EU/DL
WBC # BLD AUTO: 6.92 K/UL (ref 3.9–12.7)

## 2020-01-08 PROCEDURE — 96361 HYDRATE IV INFUSION ADD-ON: CPT

## 2020-01-08 PROCEDURE — 63600175 PHARM REV CODE 636 W HCPCS: Performed by: EMERGENCY MEDICINE

## 2020-01-08 PROCEDURE — 25000003 PHARM REV CODE 250: Performed by: EMERGENCY MEDICINE

## 2020-01-08 PROCEDURE — 93010 EKG 12-LEAD: ICD-10-PCS | Mod: ,,, | Performed by: INTERNAL MEDICINE

## 2020-01-08 PROCEDURE — 80053 COMPREHEN METABOLIC PANEL: CPT

## 2020-01-08 PROCEDURE — 93005 ELECTROCARDIOGRAM TRACING: CPT

## 2020-01-08 PROCEDURE — 81003 URINALYSIS AUTO W/O SCOPE: CPT

## 2020-01-08 PROCEDURE — 84484 ASSAY OF TROPONIN QUANT: CPT

## 2020-01-08 PROCEDURE — 96360 HYDRATION IV INFUSION INIT: CPT

## 2020-01-08 PROCEDURE — 99285 EMERGENCY DEPT VISIT HI MDM: CPT | Mod: 25

## 2020-01-08 PROCEDURE — 82962 GLUCOSE BLOOD TEST: CPT

## 2020-01-08 PROCEDURE — 93010 ELECTROCARDIOGRAM REPORT: CPT | Mod: ,,, | Performed by: INTERNAL MEDICINE

## 2020-01-08 PROCEDURE — 85025 COMPLETE CBC W/AUTO DIFF WBC: CPT

## 2020-01-08 PROCEDURE — 83735 ASSAY OF MAGNESIUM: CPT

## 2020-01-08 RX ORDER — CHOLECALCIFEROL (VITAMIN D3) 25 MCG
1000 TABLET ORAL DAILY
COMMUNITY

## 2020-01-08 RX ORDER — NAPROXEN SODIUM 220 MG/1
243 TABLET, FILM COATED ORAL
Status: COMPLETED | OUTPATIENT
Start: 2020-01-08 | End: 2020-01-08

## 2020-01-08 RX ADMIN — SODIUM CHLORIDE 250 ML: 0.9 INJECTION, SOLUTION INTRAVENOUS at 02:01

## 2020-01-08 RX ADMIN — SODIUM CHLORIDE 500 ML: 0.9 INJECTION, SOLUTION INTRAVENOUS at 12:01

## 2020-01-08 RX ADMIN — ASPIRIN 81 MG 243 MG: 81 TABLET ORAL at 02:01

## 2020-01-08 NOTE — DISCHARGE INSTRUCTIONS
Hold your blood pressure medication for the next 48 hr.  May resume if blood pressure is greater than 130.    Eat regular meals.  Do not skip meals.

## 2020-01-08 NOTE — ED NOTES
Pt reports being at [facility], after a PCP follow-up, and being helped to the ground. Pt reports her BP dropped and she felt weak. Pt reports    Patient moved to ED room 17, patient assisted onto stretcher and changed into a gown. Patient placed on continuous pulse oximetry and automatic blood pressure cuff. Bed placed in low locked position, side rails up x 2, call light is within reach of patient or family, orientation to room and explanation of wait provided to family and patient, alarms set and turned on for monitor and pulse ox, awaiting MD evaluation and orders, will continue to monitor.    Patient identifies self as Tessie B Champion      LOC: The patient is awake, alert and aware of environment with an appropriate affect, the patient is oriented x 3 and speaking appropriately.  APPEARANCE: Patient resting comfortably and in no acute distress, patient is clean and well groomed, patient's clothing is properly fastened.  SKIN: The skin is warm, dry, and flaky, color is pale, patient has normal skin turgor and moist mucus membranes, skin intact EX: generalized bruising  MUSCULOSKELETAL: Patient moving all extremities well, no obvious swelling or deformities noted.  RESPIRATORY: Airway is open and patent, respirations are spontaneous, patient has a normal effort and rate, no accessory muscle use noted.  CARDIAC: Patient has a normal rate, no peripheral edema noted, capillary refill < 3 seconds.  ABDOMEN: Soft and non tender to palpation, no distention noted.  NEUROLOGIC: PERRL, eyes open spontaneously, behavior appropriate to situation, follows commands, facial expression symmetrical, bilateral hand grasp equal and even, purposeful motor response noted, normal sensation in all extremities when touched with a finger.

## 2020-01-08 NOTE — ED NOTES
The patient is resting quietly, arouses easily to stimuli. Airway is open and patent, respirations are spontaneous, normal respiratory effort and rate noted, skin warm and dry, appearance: in no acute distress and resting comfortably.

## 2020-01-08 NOTE — ED PROVIDER NOTES
"SCRIBE #1 NOTE: I, Lacie Herrera, am scribing for, and in the presence of, Martine Hill DO. I have scribed the entire note.       History     Chief Complaint   Patient presents with    Near Syncope     pt "helped to the floor" denies LOC. Initially hypotensive upon [facility] arrival     Review of patient's allergies indicates:   Allergen Reactions    Pcn [penicillins]     Sulfa (sulfonamide antibiotics)          History of Present Illness     HPI    1/8/2020, 11:12 AM  History obtained from the patient      History of Present Illness: Tessie Champion is a 88 y.o. female patient with a PMHx of HTN who presents to the Emergency Department for evaluation of near syncope which onset gradually PTA. Symptoms are episodic and moderate in severity. Pt visited Dr. Fleming this morning. Pt was helped to the ED floor. Pt's daughter-in-law reports pt was waiting in line at [employer] and fainted staring blankly while grabbing the cart.  They report that they were in [place].  A customer was in front of them buying lots of supplies for care package for the Homeless.  It seems likely about 300 dollars worth of stuff.  Daughter-in-law reports having to pry off pt's fingers from the cart.  Patient did not actually lose consciousness, but was staring blankly ahead.  Daughter-in-law reports EMS took pt's BP upon arrival and was initially hypotensive at the scene. No mitigating or exacerbating factors reported. No associated sxs reported. Patient denies any LOC, fever/chills, fatigue, congestion, sore throat, SOB, cough, CP, palpitations, n/v/d, dysuria, hematuria, rash, back pain, HA, abdominal pain bruises/blds easily , and all other sxs at this time. No prior Tx reported. No further complaints or concerns at this time.       Arrival mode: [facility]    PCP: Kadeem Fleming MD        Past Medical History:  Past Medical History:   Diagnosis Date    Hypertension        Past Surgical History:  History reviewed. No pertinent " surgical history.      Family History:  History reviewed. No pertinent family history.    Social History:  Social History     Tobacco Use    Smoking status: Former Smoker     Start date: 1973     Last attempt to quit: 1998     Years since quittin.5   Substance and Sexual Activity    Alcohol use: Yes     Alcohol/week: 1.0 standard drinks     Types: 1 Cans of beer per week    Drug use: Never    Sexual activity: Not Currently     Partners: Male        Review of Systems     Review of Systems   Constitutional: Negative for chills, fatigue and fever.   HENT: Negative for congestion and sore throat.    Respiratory: Negative for cough and shortness of breath.    Cardiovascular: Negative for chest pain.   Gastrointestinal: Negative for diarrhea, nausea and vomiting.   Genitourinary: Negative for dysuria and hematuria.   Musculoskeletal: Negative for back pain.   Skin: Negative for rash.   Neurological: Positive for syncope (Near syncope);  She was helped to the ground.. Negative for headaches.        (-) LOC   Hematological: Does not bruise/bleed easily.   All other systems reviewed and are negative.     Physical Exam     Initial Vitals [20 1058]   BP Pulse Resp Temp SpO2   (!) 87/57 76 18 97.8 °F (36.6 °C) 99 %      MAP       --          Physical Exam  Nursing Notes and Vital Signs Reviewed.  Constitutional: Patient is in no acute distress. Well-developed and well-nourished.  Head: Atraumatic. Normocephalic.  Eyes: PERRL. EOM intact. Conjunctivae are not pale. No scleral icterus.  ENT: Mucous membranes are moist. Oropharynx is clear and symmetric.    Neck: Bruie on L. Full ROM. No lymphadenopathy.  No bruits.  Cardiovascular: Regular rate. Regular rhythm. No murmurs, rubs, or gallops. Distal pulses are 2+ and symmetric.  Pulmonary/Chest: No respiratory distress. Clear to auscultation bilaterally. No wheezing or rales.  Abdominal: Soft and non-distended.  There is no tenderness.  No rebound,  guarding, or rigidity. Good bowel sounds.  Genitourinary: No CVA tenderness  Musculoskeletal: Moves all extremities. No obvious deformities. No edema. No calf tenderness.  Skin: Warm and dry.  Neurological:  Alert, awake, and appropriate.  Normal speech.  No acute focal neurological deficits are appreciated.  Cranial nerves 2-12 intact.  NIH Stroke Scale equals 0.  GCS is 15.  Psychiatric: Normal affect. Good eye contact. Appropriate in content.     ED Course   Procedures  ED Vital Signs:  Vitals:    01/08/20 1058 01/08/20 1108 01/08/20 1109 01/08/20 1111   BP: (!) 87/57 (!) 91/56 92/63 97/68   Pulse: 76      Resp: 18      Temp: 97.8 °F (36.6 °C)      TempSrc: Oral      SpO2: 99%       01/08/20 1113 01/08/20 1140 01/08/20 1303 01/08/20 1403   BP: 99/68  107/67 117/76   Pulse:  80 92 90   Resp:   20 18   Temp:   98.2 °F (36.8 °C)    TempSrc:   Oral    SpO2:   99% 97%    01/08/20 1447 01/08/20 1500 01/08/20 1532 01/08/20 1647   BP: (!) 104/54 (!) 106/55 122/70 117/72   Pulse: 85 88 88 84   Resp: 19 18 18 18   Temp:  98.1 °F (36.7 °C)     TempSrc:  Oral     SpO2: 96% 97% 98% 98%       Abnormal Lab Results:  Labs Reviewed   CBC W/ AUTO DIFFERENTIAL - Abnormal; Notable for the following components:       Result Value    Immature Granulocytes 0.6 (*)     Lymph # 0.9 (*)     Gran% 76.2 (*)     Lymph% 13.6 (*)     All other components within normal limits   COMPREHENSIVE METABOLIC PANEL - Abnormal; Notable for the following components:    Sodium 131 (*)     Chloride 94 (*)     CO2 22 (*)     Creatinine 1.6 (*)     eGFR if  33 (*)     eGFR if non  29 (*)     All other components within normal limits   TROPONIN I - Abnormal; Notable for the following components:    Troponin I 0.067 (*)     All other components within normal limits   URINALYSIS, REFLEX TO URINE CULTURE - Abnormal; Notable for the following components:    Occult Blood UA Trace (*)     All other components within normal limits     Narrative:     Preferred Collection Type->Urine, Clean Catch   TROPONIN I - Abnormal; Notable for the following components:    Troponin I 0.064 (*)     All other components within normal limits   POCT GLUCOSE - Abnormal; Notable for the following components:    POCT Glucose 68 (*)     All other components within normal limits   MAGNESIUM   POCT GLUCOSE   POCT GLUCOSE   POCT GLUCOSE        All Lab Results:  Results for orders placed or performed during the hospital encounter of 01/08/20   CBC auto differential   Result Value Ref Range    WBC 6.92 3.90 - 12.70 K/uL    RBC 4.61 4.00 - 5.40 M/uL    Hemoglobin 13.9 12.0 - 16.0 g/dL    Hematocrit 41.9 37.0 - 48.5 %    Mean Corpuscular Volume 91 82 - 98 fL    Mean Corpuscular Hemoglobin 30.2 27.0 - 31.0 pg    Mean Corpuscular Hemoglobin Conc 33.2 32.0 - 36.0 g/dL    RDW 12.6 11.5 - 14.5 %    Platelets 173 150 - 350 K/uL    MPV 9.5 9.2 - 12.9 fL    Immature Granulocytes 0.6 (H) 0.0 - 0.5 %    Gran # (ANC) 5.3 1.8 - 7.7 K/uL    Immature Grans (Abs) 0.04 0.00 - 0.04 K/uL    Lymph # 0.9 (L) 1.0 - 4.8 K/uL    Mono # 0.6 0.3 - 1.0 K/uL    Eos # 0.0 0.0 - 0.5 K/uL    Baso # 0.03 0.00 - 0.20 K/uL    nRBC 0 0 /100 WBC    Gran% 76.2 (H) 38.0 - 73.0 %    Lymph% 13.6 (L) 18.0 - 48.0 %    Mono% 8.8 4.0 - 15.0 %    Eosinophil% 0.4 0.0 - 8.0 %    Basophil% 0.4 0.0 - 1.9 %    Differential Method Automated    Comprehensive metabolic panel   Result Value Ref Range    Sodium 131 (L) 136 - 145 mmol/L    Potassium 4.5 3.5 - 5.1 mmol/L    Chloride 94 (L) 95 - 110 mmol/L    CO2 22 (L) 23 - 29 mmol/L    Glucose 106 70 - 110 mg/dL    BUN, Bld 17 8 - 23 mg/dL    Creatinine 1.6 (H) 0.5 - 1.4 mg/dL    Calcium 10.1 8.7 - 10.5 mg/dL    Total Protein 7.8 6.0 - 8.4 g/dL    Albumin 4.5 3.5 - 5.2 g/dL    Total Bilirubin 0.9 0.1 - 1.0 mg/dL    Alkaline Phosphatase 60 55 - 135 U/L    AST 29 10 - 40 U/L    ALT 15 10 - 44 U/L    Anion Gap 15 8 - 16 mmol/L    eGFR if African American 33 (A) >60 mL/min/1.73 m^2     eGFR if non African American 29 (A) >60 mL/min/1.73 m^2   Troponin I   Result Value Ref Range    Troponin I 0.067 (H) 0.000 - 0.026 ng/mL   Magnesium   Result Value Ref Range    Magnesium 2.0 1.6 - 2.6 mg/dL   Urinalysis, Reflex to Urine Culture Urine, Clean Catch   Result Value Ref Range    Specimen UA Urine, Clean Catch     Color, UA Yellow Yellow, Straw, Emely    Appearance, UA Clear Clear    pH, UA 6.0 5.0 - 8.0    Specific Gravity, UA 1.015 1.005 - 1.030    Protein, UA Negative Negative    Glucose, UA Negative Negative    Ketones, UA Negative Negative    Bilirubin (UA) Negative Negative    Occult Blood UA Trace (A) Negative    Nitrite, UA Negative Negative    Urobilinogen, UA Negative <2.0 EU/dL    Leukocytes, UA Negative Negative   Troponin I   Result Value Ref Range    Troponin I 0.064 (H) 0.000 - 0.026 ng/mL   POCT glucose   Result Value Ref Range    POCT Glucose 68 (L) 70 - 110 mg/dL   POCT glucose   Result Value Ref Range    POCT Glucose 95 70 - 110 mg/dL   POCT glucose   Result Value Ref Range    POCT Glucose 105 70 - 110 mg/dL   POCT glucose   Result Value Ref Range    POCT Glucose 95 70 - 110 mg/dL       Imaging Results:  Imaging Results          X-Ray Chest AP Portable (Final result)  Result time 01/08/20 12:47:18    Final result by Alvaro Montoya Jr., MD (01/08/20 12:47:18)                 Impression:      Findings are similar to prior.  No acute pulmonary findings.      Electronically signed by: Alvaro Montoya Jr., MD  Date:    01/08/2020  Time:    12:47             Narrative:    EXAMINATION:  XR CHEST AP PORTABLE    CLINICAL HISTORY:  Syncope and collapse    COMPARISON:  Prior radiograph from May 4, 2018.    FINDINGS:  Hyperinflation of the chest.  Apical calcific pleural scarring.  No confluent airspace opacity.  No pleural fluid or pneumothorax.  Heart size within normal limits.No significant bony findings.                                 The EKG was ordered, reviewed, and independently  interpreted by the ED provider.  Interpretation time: 11:36:07  Rate: 79 BPM  Rhythm: sinus rhythm with premature atrial complexes  Interpretation: Nonspecific ST and T wave abnormality. No STEMI.             The Emergency Provider reviewed the vital signs and test results, which are outlined above.     ED Discussion     1:13 PM: Re-evaluated pt. Pt is resting comfortably and is in no acute distress. D/w pt any concerns expressed at this time. Answered all questions. Pt expresses understanding at this time.    2:05 PM: Re-evaluated pt. I have discussed test results, shared treatment plan, and the need for admission with patient and family at bedside. Pt states she wishes to be discharged, but understands concern and agrees to be admitted. Pt and family express understanding at this time and agree with all information. All questions answered. Pt and family have no further questions or concerns at this time. Pt is ready for admit.    2:48 PM: Discussed pt's case with FABRIZIO Kelly (Hospital Medicine) who recommends repeating troponin and getting urine analysis. She states if both come back normal then pt is stable for discharge.    4:39 PM:  Lab tests were discussed with Hospital Medicine.  They recommend patient be discharged home.  Reassessed pt at this time. Pt states her condition has improved at this time. Discussed with pt all pertinent ED information and results. Discussed pt dx and plan of tx. Gave pt all f/u and return to the ED instructions. All questions and concerns were addressed at this time. Pt expresses understanding of information and instructions, and is comfortable with plan to discharge. Pt is stable for discharge.    Patient presents with a syncopal or near-syncopal episode. I have no suspicion that the event is secondary to an arrhythmia such as WPW, prolonged QT syndrome, or ventricular tachycardia. I have no suspicion for a seizure episode, intracranial hemorrhage, pulmonary embolus,  myocardial infarction, sepsis, ectopic pregnancy, hemorrhagic shock, or hypoglycemia. Based on my evaluation, there is no emergent medical condition. Syncope precautions were discussed with the patient and/or caretaker, specifically not to swim or bathe unattended, not to operate motor vehicles or other machinery, and to avoid heights or other areas where falls may occur until cleared by primary care physician. Patient is safe for discharge.       Medical Decision Making:   Initial Assessment:   Patient is a 88-year-old female presenting emergency department with a near syncopal event.  Patient had been standing in line for a long time at [employer].  Patient's daughter reports that the patient does not eat much food or drink much water.  Patient denies any known history of congestive heart failure.  Patient was noted to be hypotensive at the scene per EMS.  Differential Diagnosis:   Dehydration, hypoglycemia, atypical ACS, abdominal aneurysm,  Independently Interpreted Test(s):   I have ordered and independently interpreted EKG Reading(s) - see prior notes  Clinical Tests:   Lab Tests: Ordered and Reviewed  Radiological Study: Reviewed and Ordered  Medical Tests: Reviewed and Ordered  ED Management:  Patient noted to be hypoglycemic on Accu-Chek.  Patient provided meal in the emergency department.  Patient ate a small portion a sandwich and drink the juice.  Patient continued to be monitor on a cardiac monitor.  No arrhythmia was noted.  Patient also hypotensive.  Patient given fluid bolus.  Patient's initial troponin was noted to be elevated.  However patient is nuclear stress test and prior echocardiogram were normal.  No evidence of congestive heart failure.  Patient's blood pressure continued to improve with fluid bolus.  Patient's initial troponin was believed to be elevated secondary to hypotension.  Patient did not have any chest pain, chest pressure, indigestion to suggest ACS symptom.  Case was  discussed with hospital medicine who recommended repeating the troponin.  There was no rise in the troponin level.  Case was discussed with hospital medicine again, they recommend patient be discharged home.  Patient was encouraged to eat regular meals.  Discussed indications to return to the emergency room.  All questions answered.  Other:   I have discussed this case with another health care provider.   NM [facility] Pharm Stress Cardiac Component   Order: [id number]   Status:  Final result   Visible to patient:  No (Not Released) Next appt:  None Dx:  Elevated troponin; Acute chest pain; ...   Details        Narrative     Date of Procedure: 06/19/2018    PRE-TEST DATA   EKG: Resting electrocardiogram reveals sinus rhythm at a rate of 78 bpm.     TEST DESCRIPTION   The patient received 0.4 mg of Regadenoson as an IV bolus. Peak heart rate was 99 bpm, which is 76% of the age predicted maximum heart rate. .     EKG Conclusions:    1. The EKG portion of this study is negative for ischemia at a peak heart rate of 99 bpm (76% of predicted).   2. Blood pressure remained stable throughout the protocol  (Presenting BP: 145/106 Peak BP: 122/82).   3. No significant arrhythmias were present.   4. There were no symptoms of chest discomfort or significant dyspnea throughout the protocol.     Nuclear Procedure:  Following a single isotope protocol, 10 mCi of Tc99 labeled Tetrofosmin was given at rest and tomographic imaging was performed. Regadenoson pharmacologic stress testing was performed as described above. Immediately following the IV bolus of regadenoson,   30 mCi of Tc99 labeled Tetrofosmin was given and tomographic imaging was performed. The site of the IV injection was the right hand. Images were obtained on a [employer] camera.     Comments:  This is a technically adequate study. Inspection of the transaxial images demonstrated no significant cranial, caudal, or lateral patient motion in the camera between rest and  stress acquisitions. There is homogeneous uptake of radiotracer in all walls   of the myocardium on stress and rest images. The extracardiac distribution of radioactivity is normal. The left ventricular cavity is normal in size and does not increase with stress. On gated SPECT, left ventricular motion is normal at rest.     Nuclear Quantitative Functional Analysis:   LVEF: 65 %  LVED Volume: 57 ml  LVES Volume: 20 ml    Impression: NORMAL MYOCARDIAL PERFUSION  1. The perfusion scan is free of evidence for myocardial ischemia or injury.   2. Resting wall motion is physiologic.   3. Resting LV function is normal.   4. The ventricular volumes are normal at rest and stress.   5. The extracardiac distribution of radioactivity is normal.           This document has been electronically    SIGNED BY: Phil Reina MD On: 06/19/2018 15:17      Component 1yr ago   Diastolic Dysfunction No    Resulting Agency [facility]         Specimen Collected: 06/19/18 11:00 Last Resulted: 06/19/18 15:24 Lab Flowsheet Order Details View Encounter Lab and Collection Details Routing Result History             Additional MDM:   Hypoglycemia: Arrival Mode: [facility]asi The EMS glucose level was 68. The initial ED glucose level was 68. ED Therapy: the patient was given a diet to eat. The repeat glucose level was 95. The patient tolerated the above treatment and did not have any complications.       ED Medication(s):  Medications   sodium chloride 0.9% bolus 500 mL (0 mLs Intravenous Stopped 1/8/20 1418)   aspirin chewable tablet 243 mg (243 mg Oral Given 1/8/20 1417)   sodium chloride 0.9% bolus 250 mL (0 mLs Intravenous Stopped 1/8/20 1656)       Discharge Medication List as of 1/8/2020  4:43 PM          Follow-up Information     Kadeem Fleming MD In 2 days.    Specialty:  Family Medicine  Why:  Return to emergency department for:  Chest pain, chest pressure, fatigue, confusion, or other concerns.  Contact information:  [address]  [place]  [facility] [place] [address]  [phone]                       Scribe Attestation:   Scribe #1: I performed the above scribed service and the documentation accurately describes the services I performed. I attest to the accuracy of the note.     Attending:   Physician Attestation Statement for Scribe #1: I, Martine Hill DO, personally performed the services described in this documentation, as scribed by Lacie Herrera, in my presence, and it is both accurate and complete.           Clinical Impression       ICD-10-CM ICD-9-CM   1. Hypotension, unspecified hypotension type I95.9 458.9   2. Near syncope R55 780.2   3. Hypoglycemia E16.2 251.2   4. Dehydration E86.0 276.51   5. Renal insufficiency N28.9 593.9       Disposition:   Disposition: Discharged  Condition: Stable       Martine Hill DO  01/09/20 0842

## 2020-01-24 ENCOUNTER — TELEPHONE (OUTPATIENT)
Dept: CARDIOLOGY | Facility: CLINIC | Age: 85
End: 2020-01-24

## 2020-01-24 NOTE — TELEPHONE ENCOUNTER
Received a referral from Dr. Kadeem Fleming [phone] fax [phone]--called to patient to schedule--spoke with patient's daughter-in-law--states patient does not wish to schedule appointment with cardiologist at this time--states she will speak with patient again and if she decides to see cardiologist she will call our office back to reschedule--referral and Dr. Fleming's office note has been sent to scan

## 2020-02-20 ENCOUNTER — OFFICE VISIT (OUTPATIENT)
Dept: CARDIOLOGY | Facility: CLINIC | Age: 85
End: 2020-02-20
Payer: MEDICARE

## 2020-02-20 VITALS
BODY MASS INDEX: 22.21 KG/M2 | HEART RATE: 62 BPM | WEIGHT: 120.69 LBS | OXYGEN SATURATION: 99 % | SYSTOLIC BLOOD PRESSURE: 122 MMHG | DIASTOLIC BLOOD PRESSURE: 70 MMHG | HEIGHT: 62 IN

## 2020-02-20 DIAGNOSIS — R79.89 ELEVATED TROPONIN: ICD-10-CM

## 2020-02-20 DIAGNOSIS — I10 ESSENTIAL HYPERTENSION: ICD-10-CM

## 2020-02-20 DIAGNOSIS — E03.8 OTHER SPECIFIED HYPOTHYROIDISM: ICD-10-CM

## 2020-02-20 DIAGNOSIS — R55 SYNCOPE AND COLLAPSE: Primary | ICD-10-CM

## 2020-02-20 PROCEDURE — 99214 OFFICE O/P EST MOD 30 MIN: CPT | Mod: S$PBB,,, | Performed by: INTERNAL MEDICINE

## 2020-02-20 PROCEDURE — 99214 PR OFFICE/OUTPT VISIT, EST, LEVL IV, 30-39 MIN: ICD-10-PCS | Mod: S$PBB,,, | Performed by: INTERNAL MEDICINE

## 2020-02-20 PROCEDURE — 99213 OFFICE O/P EST LOW 20 MIN: CPT | Mod: PBBFAC | Performed by: INTERNAL MEDICINE

## 2020-02-20 PROCEDURE — 99999 PR PBB SHADOW E&M-EST. PATIENT-LVL III: ICD-10-PCS | Mod: PBBFAC,,, | Performed by: INTERNAL MEDICINE

## 2020-02-20 PROCEDURE — 99999 PR PBB SHADOW E&M-EST. PATIENT-LVL III: CPT | Mod: PBBFAC,,, | Performed by: INTERNAL MEDICINE

## 2020-02-20 NOTE — PATIENT INSTRUCTIONS
Causes of Syncope  Syncope (fainting) has many causes. Sometimes it is not serious. In other cases, syncope is a sign of a heart problem. But treatment can help    When syncope is not serious  Your healthcare provider may call your problem vasovagal syncope, reflex syncope, or orthostatic hypotension. These types of syncope are generally not serious. They can be caused by:  · Strong feelings, such as anxiety or fear. A nerve signal may briefly change your heart rate and lower your blood pressure too much.  · Standing for too long. Standing may cause blood to pool in your legs. When this happens, your brain may not receive all the blood it needs.  · Standing up too quickly. Your blood pressure may not adjust fast enough to changes in posture and may drop too low. Certain medicines can also cause this problem. Examples of medicines that can cause a drop in blood pressure include diuretics, blood pressure medicines, and medicines for chest pain. Your pharmacist or healthcare provider can discuss these with you.  · Reaction to normal body functions. When you go to the bathroom, have gastrointestinal discomfort, nausea, or pain, your heart may have a natural reflex to slow down and lower blood pressure. This can result in syncope. This may also follow exercise, eating, laughter, weight lifting, or playing musical instruments like the trumpet or trombone.  When heart trouble causes syncope  A heart problem can decrease the amount of oxygen-rich blood that reaches the brain. Heart trouble can be serious and even life threatening if not treated:  · A slow heart rate. Electrical signals tell the chambers of the heart when to pump. But the signals may be slowed or blocked (heart block) as they travel on the hearts electrical pathways. This can be caused by aging, scarred heart tissue, or damage from heart disease. When the heart rate slows, not enough blood is pumped.  · A fast heart rate. Certain problems can make the  heart race. For instance, after a heart attack, also known as acute myocardial infarction, or AMI, abnormal electrical signals may be created. These signals can make the heart suddenly beat very fast. The heart pumps before the chambers can fill with blood. So less blood reaches the brain and other parts of the body. Illegal drugs, certain medicines, heart disease, or an inherited condition can also cause this.  · A heart valve problem. Blood travels through the chambers of the heart as it is pumped. Heart valves open and close to help move blood in the right direction. But a valve may not open or close fully, if its hardened or scarred. As a result, less blood is pumped through the heart to the brain and body. Most often, syncope occurs when a person's aortic valve is critically narrowed and he or she participates in  a strenuous activity.  · A heart muscle problem. Some people develop a thickened heart muscle that blocks blood flow out of the heart to the body. This is called hypertrophic cardiomyopathy. Being dehydrated and having hypertrophic cardiomyopathy can increase the risk for syncope.  Whatever the cause of syncope, it is important to be evaluated by your healthcare provider. You may need to be seen by a cardiologist, neurologist, or an ear, nose, and throat specialist. Do not drive, operate heavy machinery, or participate in activities in which you would be at risk for falls and injury if you have syncope and have not been evaluated.  Date Last Reviewed: 5/1/2016  © 4697-7914 [employer]. [address] [place] [address], [place] [address]. All rights reserved. This information is not intended as a substitute for professional medical care. Always follow your healthcare professional's instructions.

## 2020-02-20 NOTE — PROGRESS NOTES
Subjective:   Patient ID:  Tessie Champion is a 88 y.o. female who presents for cardiac consult of Loss of Consciousness and Dizziness      Loss of Consciousness   Associated symptoms include back pain and dizziness. Pertinent negatives include no chest pain.   Dizziness:    Associated symptoms: syncope.no chest pain.    The patient came in today for cardiac consult of Loss of Consciousness and Dizziness    Tessie Champion is a 88 y.o. female pt with Hypertension, hypothyroidism, CKD, lumbar back pain here for CV follow up.     6/11/18  This is an 86 year old female with history of Hypertension, hypothyroidism, CKD, lumbar back pain who presents to ED for further evaluation of right arm pain that acutely on 5/4/18. Pain radiated to upper back pain(across scapula)and neck. Other associated symptoms include tingling involving 2 digits on right hand.  CT Head was negative. CT Cervical and Thoracic spine showed osteoarthritis changes, compression fracture involving T11 and L1. MRI was recommended, but patient declined. Other work up revealed elevated Troponin 0.031>>0.037. EKG showed nonspecific ST changes. She denied chest pain and other anginal equivalent. Echocardiogram did not show WMA or another abnormality.     2/20/20  ER visit last month for evaluation of near syncope which onset gradually PTA. Symptoms are episodic and moderate in severity. Pt visited Dr. Fleming this morning. Pt was helped to the ED floor. Pt's daughter-in-law reports pt was waiting in line at [employer] and fainted staring blankly while grabbing the cart.  They report that they were in [place].  A customer was in front of them buying lots of supplies for care package for the Homeless.  It seems likely about 300 dollars worth of stuff.  Daughter-in-law reports having to pry off pt's fingers from the cart.  Patient did not actually lose consciousness, but was staring blankly ahead.  Daughter-in-law reports EMS took pt's BP upon arrival  and was initially hypotensive at the scene. No mitigating or exacerbating factors reported. No associated sxs reported. Patient denies any LOC, fever/chills, fatigue, congestion, sore throat, SOB, cough, CP, palpitations, n/v/d, dysuria, hematuria, rash, back pain, HA, abdominal pain bruises/blds easily , and all other sxs at this time. No prior Tx reported.     Trop mildly positive and flat. She had another episode of syncope after that episode but did not want to go to ER. She was at [facility] and passed it. Stress and ECHO neg in 2018.     Patient feels no sob, no leg swelling, no PND, no palpitation.     Patient has dec exercise tolerance.    Patient is compliant with medications.    Nuclear Quantitative Functional Analysis:   LVEF: 65 %  LVED Volume: 57 ml  LVES Volume: 20 ml    Impression: NORMAL MYOCARDIAL PERFUSION  1. The perfusion scan is free of evidence for myocardial ischemia or injury.   2. Resting wall motion is physiologic.   3. Resting LV function is normal.   4. The ventricular volumes are normal at rest and stress.   5. The extracardiac distribution of radioactivity is normal.       This document has been electronically    SIGNED BY: Phil Reina MD On: 2018 15:17    2D ECHO CONCLUSIONS     1 - No wall motion abnormalities.     2 - Normal left ventricular systolic function (EF 60-65%).     3 - Normal left ventricular diastolic function.     4 - Normal right ventricular systolic function .     5 - The estimated PA systolic pressure is 20 mmHg.     This document has been electronically    SIGNED BY: Narinder Woodall MD On: 2018 15:32  Past Medical History:   Diagnosis Date    Hypertension        History reviewed. No pertinent surgical history.    Social History     Tobacco Use    Smoking status: Former Smoker     Start date: 1973     Last attempt to quit: 1998     Years since quittin.7   Substance Use Topics    Alcohol use: Yes     Alcohol/week: 1.0 standard drinks      "Types: 1 Cans of beer per week    Drug use: Never       History reviewed. No pertinent family history.    Patient's Medications   New Prescriptions    No medications on file   Previous Medications    LEVOTHYROXINE SODIUM (LEVOTHYROXINE ORAL)    Take 1 tablet by mouth once daily.    TRAMADOL (ULTRAM-ER) 100 MG TB24    Take 100 mg by mouth 2 (two) times daily.    VITAMIN D (VITAMIN D3) 1000 UNITS TAB    Take 1,000 Units by mouth once daily.   Modified Medications    No medications on file   Discontinued Medications    LISINOPRIL 10 MG TABLET    Take 1 tablet (10 mg total) by mouth once daily.       Review of Systems   Constitutional: Negative.    HENT: Negative.    Eyes: Negative.    Respiratory: Negative.    Cardiovascular: Positive for syncope. Negative for chest pain.   Gastrointestinal: Negative.    Genitourinary: Negative.    Musculoskeletal: Positive for back pain and joint pain.   Skin: Negative.    Neurological: Positive for dizziness and loss of consciousness.   Endo/Heme/Allergies: Negative.    Psychiatric/Behavioral: Negative.    All 12 systems otherwise negative.      Wt Readings from Last 3 Encounters:   02/20/20 54.8 kg (120 lb 11.2 oz)   06/11/18 55.1 kg (121 lb 7.6 oz)     Temp Readings from Last 3 Encounters:   01/08/20 98.1 °F (36.7 °C) (Oral)   05/04/18 98.1 °F (36.7 °C) (Oral)     BP Readings from Last 3 Encounters:   02/20/20 122/70   01/08/20 117/72   06/11/18 125/85     Pulse Readings from Last 3 Encounters:   02/20/20 62   01/08/20 84   06/11/18 72       /70 (BP Location: Left arm, Patient Position: Sitting, BP Method: Small (Manual))   Pulse 62   Ht 5' 2" (1.575 m)   Wt 54.8 kg (120 lb 11.2 oz)   SpO2 99%   BMI 22.08 kg/m²     Objective:   Physical Exam   Constitutional: She is oriented to person, place, and time. She appears well-developed and well-nourished. No distress.   HENT:   Head: Normocephalic and atraumatic.   Nose: Nose normal.   Mouth/Throat: Oropharynx is clear and " moist.   Eyes: Conjunctivae and EOM are normal. No scleral icterus.   Neck: Normal range of motion. Neck supple. No JVD present. No thyromegaly present.   Cardiovascular: Normal rate, regular rhythm, S1 normal and S2 normal. Exam reveals no gallop, no S3, no S4 and no friction rub.   Murmur heard.  Pulmonary/Chest: Effort normal and breath sounds normal. No stridor. No respiratory distress. She has no wheezes. She has no rales. She exhibits no tenderness.   Abdominal: Soft. Bowel sounds are normal. She exhibits no distension and no mass. There is no tenderness. There is no rebound.   Genitourinary:   Genitourinary Comments: Deferred   Musculoskeletal: Normal range of motion. She exhibits no edema, tenderness or deformity.   Lymphadenopathy:     She has no cervical adenopathy.   Neurological: She is alert and oriented to person, place, and time. She exhibits normal muscle tone. Coordination normal.   Skin: Skin is warm and dry. No rash noted. She is not diaphoretic. No erythema. No pallor.   Psychiatric: She has a normal mood and affect. Her behavior is normal. Judgment and thought content normal.   Nursing note and vitals reviewed.      Lab Results   Component Value Date     (L) 01/08/2020    K 4.5 01/08/2020    CL 94 (L) 01/08/2020    CO2 22 (L) 01/08/2020    BUN 17 01/08/2020    CREATININE 1.6 (H) 01/08/2020     01/08/2020    MG 2.0 01/08/2020    AST 29 01/08/2020    ALT 15 01/08/2020    ALBUMIN 4.5 01/08/2020    PROT 7.8 01/08/2020    BILITOT 0.9 01/08/2020    WBC 6.92 01/08/2020    HGB 13.9 01/08/2020    HCT 41.9 01/08/2020    MCV 91 01/08/2020     01/08/2020     Assessment:      1. Syncope and collapse    2. Essential hypertension    3. Elevated troponin    4. Other specified hypothyroidism        Plan:   1. Syncope  - ?vasovagal  - prior stress neg  - order ECHO - mildly elevated trop  - neuro eval  - carotid u/s and ECHO ordered  - 48 hour Holter ordered   - rec compression stocking and  avoid prolonged standing  - increase hydration     2. HTN  - stop Lisinopril     3. Hypothyroidism  - cont meds per PCP    Thank you for allowing me to participate in this patient's care. Please do not hesitate to contact me with any questions or concerns. Consult note has been forwarded to the referral physician.

## 2022-02-02 ENCOUNTER — HOSPITAL ENCOUNTER (INPATIENT)
Facility: HOSPITAL | Age: 87
LOS: 7 days | Discharge: HOSPICE | DRG: 682 | End: 2022-02-09
Attending: FAMILY MEDICINE | Admitting: INTERNAL MEDICINE
Payer: MEDICARE

## 2022-02-02 DIAGNOSIS — R79.89 ELEVATED TROPONIN: ICD-10-CM

## 2022-02-02 DIAGNOSIS — L89.90 PRESSURE ULCERS OF SKIN OF MULTIPLE TOPOGRAPHIC SITES: ICD-10-CM

## 2022-02-02 DIAGNOSIS — I21.4 NSTEMI (NON-ST ELEVATED MYOCARDIAL INFARCTION): Primary | ICD-10-CM

## 2022-02-02 DIAGNOSIS — R53.1 WEAKNESS: ICD-10-CM

## 2022-02-02 LAB
ALBUMIN SERPL BCP-MCNC: 3.6 G/DL (ref 3.5–5.2)
ALP SERPL-CCNC: 71 U/L (ref 55–135)
ALT SERPL W/O P-5'-P-CCNC: 15 U/L (ref 10–44)
ANION GAP SERPL CALC-SCNC: 20 MMOL/L (ref 8–16)
AST SERPL-CCNC: 16 U/L (ref 10–40)
BASOPHILS # BLD AUTO: 0.02 K/UL (ref 0–0.2)
BASOPHILS NFR BLD: 0.2 % (ref 0–1.9)
BILIRUB SERPL-MCNC: 0.7 MG/DL (ref 0.1–1)
BILIRUB UR QL STRIP: NEGATIVE
BNP SERPL-MCNC: 142 PG/ML (ref 0–99)
BUN SERPL-MCNC: 99 MG/DL (ref 8–23)
CALCIUM SERPL-MCNC: 10 MG/DL (ref 8.7–10.5)
CHLORIDE SERPL-SCNC: 113 MMOL/L (ref 95–110)
CK SERPL-CCNC: 112 U/L (ref 20–180)
CLARITY UR: CLEAR
CO2 SERPL-SCNC: 20 MMOL/L (ref 23–29)
COLOR UR: YELLOW
CREAT SERPL-MCNC: 3.1 MG/DL (ref 0.5–1.4)
DIFFERENTIAL METHOD: ABNORMAL
EOSINOPHIL # BLD AUTO: 0 K/UL (ref 0–0.5)
EOSINOPHIL NFR BLD: 0 % (ref 0–8)
ERYTHROCYTE [DISTWIDTH] IN BLOOD BY AUTOMATED COUNT: 12.8 % (ref 11.5–14.5)
EST. GFR  (AFRICAN AMERICAN): 15 ML/MIN/1.73 M^2
EST. GFR  (NON AFRICAN AMERICAN): 13 ML/MIN/1.73 M^2
GLUCOSE SERPL-MCNC: 90 MG/DL (ref 70–110)
GLUCOSE UR QL STRIP: NEGATIVE
HCT VFR BLD AUTO: 42.7 % (ref 37–48.5)
HGB BLD-MCNC: 13.5 G/DL (ref 12–16)
HGB UR QL STRIP: ABNORMAL
IMM GRANULOCYTES # BLD AUTO: 0.07 K/UL (ref 0–0.04)
IMM GRANULOCYTES NFR BLD AUTO: 0.5 % (ref 0–0.5)
KETONES UR QL STRIP: NEGATIVE
LEUKOCYTE ESTERASE UR QL STRIP: NEGATIVE
LYMPHOCYTES # BLD AUTO: 1.1 K/UL (ref 1–4.8)
LYMPHOCYTES NFR BLD: 8.5 % (ref 18–48)
MCH RBC QN AUTO: 28.7 PG (ref 27–31)
MCHC RBC AUTO-ENTMCNC: 31.6 G/DL (ref 32–36)
MCV RBC AUTO: 91 FL (ref 82–98)
MONOCYTES # BLD AUTO: 0.9 K/UL (ref 0.3–1)
MONOCYTES NFR BLD: 6.5 % (ref 4–15)
NEUTROPHILS # BLD AUTO: 11.1 K/UL (ref 1.8–7.7)
NEUTROPHILS NFR BLD: 84.3 % (ref 38–73)
NITRITE UR QL STRIP: NEGATIVE
NRBC BLD-RTO: 0 /100 WBC
PH UR STRIP: 6 [PH] (ref 5–8)
PLATELET # BLD AUTO: 136 K/UL (ref 150–450)
PMV BLD AUTO: 10 FL (ref 9.2–12.9)
POTASSIUM SERPL-SCNC: 4.1 MMOL/L (ref 3.5–5.1)
PROT SERPL-MCNC: 7.7 G/DL (ref 6–8.4)
PROT UR QL STRIP: NEGATIVE
RBC # BLD AUTO: 4.7 M/UL (ref 4–5.4)
SARS-COV-2 RDRP RESP QL NAA+PROBE: NEGATIVE
SODIUM SERPL-SCNC: 153 MMOL/L (ref 136–145)
SP GR UR STRIP: 1.02 (ref 1–1.03)
TROPONIN I SERPL DL<=0.01 NG/ML-MCNC: 0.11 NG/ML (ref 0–0.03)
URN SPEC COLLECT METH UR: ABNORMAL
UROBILINOGEN UR STRIP-ACNC: NEGATIVE EU/DL
WBC # BLD AUTO: 13.13 K/UL (ref 3.9–12.7)

## 2022-02-02 PROCEDURE — 93010 ELECTROCARDIOGRAM REPORT: CPT | Mod: ,,, | Performed by: INTERNAL MEDICINE

## 2022-02-02 PROCEDURE — 21400001 HC TELEMETRY ROOM

## 2022-02-02 PROCEDURE — 99291 CRITICAL CARE FIRST HOUR: CPT | Mod: 25

## 2022-02-02 PROCEDURE — 25000003 PHARM REV CODE 250: Performed by: FAMILY MEDICINE

## 2022-02-02 PROCEDURE — U0002 COVID-19 LAB TEST NON-CDC: HCPCS | Performed by: FAMILY MEDICINE

## 2022-02-02 PROCEDURE — 82550 ASSAY OF CK (CPK): CPT | Performed by: FAMILY MEDICINE

## 2022-02-02 PROCEDURE — 25000003 PHARM REV CODE 250: Performed by: INTERNAL MEDICINE

## 2022-02-02 PROCEDURE — 93005 ELECTROCARDIOGRAM TRACING: CPT

## 2022-02-02 PROCEDURE — A4216 STERILE WATER/SALINE, 10 ML: HCPCS | Performed by: INTERNAL MEDICINE

## 2022-02-02 PROCEDURE — 81003 URINALYSIS AUTO W/O SCOPE: CPT | Performed by: FAMILY MEDICINE

## 2022-02-02 PROCEDURE — 84484 ASSAY OF TROPONIN QUANT: CPT | Performed by: FAMILY MEDICINE

## 2022-02-02 PROCEDURE — 93010 EKG 12-LEAD: ICD-10-PCS | Mod: ,,, | Performed by: INTERNAL MEDICINE

## 2022-02-02 PROCEDURE — 85025 COMPLETE CBC W/AUTO DIFF WBC: CPT | Performed by: FAMILY MEDICINE

## 2022-02-02 PROCEDURE — 80053 COMPREHEN METABOLIC PANEL: CPT | Performed by: FAMILY MEDICINE

## 2022-02-02 PROCEDURE — 83880 ASSAY OF NATRIURETIC PEPTIDE: CPT | Performed by: FAMILY MEDICINE

## 2022-02-02 RX ORDER — GLUCAGON 1 MG
1 KIT INJECTION
Status: DISCONTINUED | OUTPATIENT
Start: 2022-02-02 | End: 2022-02-10 | Stop reason: HOSPADM

## 2022-02-02 RX ORDER — TALC
6 POWDER (GRAM) TOPICAL NIGHTLY PRN
Status: DISCONTINUED | OUTPATIENT
Start: 2022-02-02 | End: 2022-02-10 | Stop reason: HOSPADM

## 2022-02-02 RX ORDER — SODIUM CHLORIDE 0.9 % (FLUSH) 0.9 %
10 SYRINGE (ML) INJECTION EVERY 8 HOURS
Status: DISCONTINUED | OUTPATIENT
Start: 2022-02-02 | End: 2022-02-10 | Stop reason: HOSPADM

## 2022-02-02 RX ORDER — POLYETHYLENE GLYCOL 3350 17 G/17G
17 POWDER, FOR SOLUTION ORAL DAILY PRN
Status: DISCONTINUED | OUTPATIENT
Start: 2022-02-02 | End: 2022-02-10 | Stop reason: HOSPADM

## 2022-02-02 RX ORDER — ACETAMINOPHEN 325 MG/1
650 TABLET ORAL EVERY 4 HOURS PRN
Status: DISCONTINUED | OUTPATIENT
Start: 2022-02-02 | End: 2022-02-10 | Stop reason: HOSPADM

## 2022-02-02 RX ORDER — HYDROCODONE BITARTRATE AND ACETAMINOPHEN 5; 325 MG/1; MG/1
1 TABLET ORAL EVERY 6 HOURS PRN
Status: DISCONTINUED | OUTPATIENT
Start: 2022-02-02 | End: 2022-02-10 | Stop reason: HOSPADM

## 2022-02-02 RX ORDER — LANOLIN ALCOHOL/MO/W.PET/CERES
800 CREAM (GRAM) TOPICAL
Status: DISCONTINUED | OUTPATIENT
Start: 2022-02-02 | End: 2022-02-02

## 2022-02-02 RX ORDER — SODIUM CHLORIDE 9 MG/ML
INJECTION, SOLUTION INTRAVENOUS CONTINUOUS
Status: DISCONTINUED | OUTPATIENT
Start: 2022-02-02 | End: 2022-02-03

## 2022-02-02 RX ORDER — NALOXONE HCL 0.4 MG/ML
0.02 VIAL (ML) INJECTION
Status: DISCONTINUED | OUTPATIENT
Start: 2022-02-02 | End: 2022-02-10 | Stop reason: HOSPADM

## 2022-02-02 RX ORDER — IBUPROFEN 200 MG
16 TABLET ORAL
Status: DISCONTINUED | OUTPATIENT
Start: 2022-02-02 | End: 2022-02-10 | Stop reason: HOSPADM

## 2022-02-02 RX ORDER — IBUPROFEN 200 MG
24 TABLET ORAL
Status: DISCONTINUED | OUTPATIENT
Start: 2022-02-02 | End: 2022-02-10 | Stop reason: HOSPADM

## 2022-02-02 RX ADMIN — SODIUM CHLORIDE 1000 ML: 0.9 INJECTION, SOLUTION INTRAVENOUS at 09:02

## 2022-02-02 RX ADMIN — Medication 10 ML: at 10:02

## 2022-02-02 RX ADMIN — SODIUM CHLORIDE: 0.9 INJECTION, SOLUTION INTRAVENOUS at 10:02

## 2022-02-03 PROBLEM — E87.0 HYPERNATREMIA: Status: ACTIVE | Noted: 2022-02-03

## 2022-02-03 PROBLEM — L98.9 SKIN LESIONS: Status: ACTIVE | Noted: 2022-02-03

## 2022-02-03 PROBLEM — R54 AGE-RELATED PHYSICAL DEBILITY: Status: ACTIVE | Noted: 2022-02-03

## 2022-02-03 PROBLEM — T74.01XA: Status: ACTIVE | Noted: 2022-02-03

## 2022-02-03 PROBLEM — N17.9 ACUTE RENAL FAILURE: Status: ACTIVE | Noted: 2022-02-03

## 2022-02-03 LAB
ANION GAP SERPL CALC-SCNC: 14 MMOL/L (ref 8–16)
AORTIC ROOT ANNULUS: 3.05 CM
ASCENDING AORTA: 2.93 CM
AV INDEX (PROSTH): 0.94
AV MEAN GRADIENT: 4 MMHG
AV PEAK GRADIENT: 5 MMHG
AV VALVE AREA: 1.85 CM2
AV VELOCITY RATIO: 0.92
BSA FOR ECHO PROCEDURE: 1.41 M2
BUN SERPL-MCNC: 74 MG/DL (ref 8–23)
CALCIUM SERPL-MCNC: 9.1 MG/DL (ref 8.7–10.5)
CHLORIDE SERPL-SCNC: 119 MMOL/L (ref 95–110)
CO2 SERPL-SCNC: 18 MMOL/L (ref 23–29)
CREAT SERPL-MCNC: 2.4 MG/DL (ref 0.5–1.4)
CV ECHO LV RWT: 1.19 CM
DOP CALC AO PEAK VEL: 1.13 M/S
DOP CALC AO VTI: 23.2 CM
DOP CALC LVOT AREA: 2 CM2
DOP CALC LVOT DIAMETER: 1.58 CM
DOP CALC LVOT PEAK VEL: 1.04 M/S
DOP CALC LVOT STROKE VOLUME: 42.92 CM3
DOP CALC RVOT PEAK VEL: 1 M/S
DOP CALC RVOT VTI: 20.4 CM
DOP CALCLVOT PEAK VEL VTI: 21.9 CM
E WAVE DECELERATION TIME: 323.22 MSEC
E/A RATIO: 0.58
E/E' RATIO: 12 M/S
ECHO EF ESTIMATED: 57 %
ECHO LV POSTERIOR WALL: 1.37 CM (ref 0.6–1.1)
EJECTION FRACTION: 65 %
EST. GFR  (AFRICAN AMERICAN): 20 ML/MIN/1.73 M^2
EST. GFR  (NON AFRICAN AMERICAN): 17 ML/MIN/1.73 M^2
FRACTIONAL SHORTENING: 28 % (ref 28–44)
GLUCOSE SERPL-MCNC: 91 MG/DL (ref 70–110)
INTERVENTRICULAR SEPTUM: 1.41 CM (ref 0.6–1.1)
IVC DIAMETER: 0.75 CM
IVRT: 65.65 MSEC
LA MAJOR: 3.13 CM
LA MINOR: 2.05 CM
LA WIDTH: 2.22 CM
LEFT ATRIUM SIZE: 1.81 CM
LEFT ATRIUM VOLUME INDEX: 5.8 ML/M2
LEFT ATRIUM VOLUME: 8.46 CM3
LEFT INTERNAL DIMENSION IN SYSTOLE: 1.66 CM (ref 2.1–4)
LEFT VENTRICLE DIASTOLIC VOLUME INDEX: 12.67 ML/M2
LEFT VENTRICLE DIASTOLIC VOLUME: 18.37 ML
LEFT VENTRICLE MASS INDEX: 69 G/M2
LEFT VENTRICLE SYSTOLIC VOLUME INDEX: 5.5 ML/M2
LEFT VENTRICLE SYSTOLIC VOLUME: 7.93 ML
LEFT VENTRICULAR INTERNAL DIMENSION IN DIASTOLE: 2.31 CM (ref 3.5–6)
LEFT VENTRICULAR MASS: 100.06 G
LV LATERAL E/E' RATIO: 12 M/S
LV SEPTAL E/E' RATIO: 12 M/S
LVOT MG: 2.08 MMHG
LVOT MV: 0.67 CM/S
MV PEAK A VEL: 1.03 M/S
MV PEAK E VEL: 0.6 M/S
MV STENOSIS PRESSURE HALF TIME: 93.73 MS
MV VALVE AREA P 1/2 METHOD: 2.35 CM2
PISA TR MAX VEL: 3.5 M/S
POTASSIUM SERPL-SCNC: 4.1 MMOL/L (ref 3.5–5.1)
PV MEAN GRADIENT: 3.01 MMHG
RA MAJOR: 2.53 CM
RA PRESSURE: 3 MMHG
RA WIDTH: 1.6 CM
SINUS: 3.1 CM
SODIUM SERPL-SCNC: 151 MMOL/L (ref 136–145)
STJ: 3.06 CM
TDI LATERAL: 0.05 M/S
TDI SEPTAL: 0.05 M/S
TDI: 0.05 M/S
TR MAX PG: 49 MMHG
TRICUSPID ANNULAR PLANE SYSTOLIC EXCURSION: 1.84 CM
TROPONIN I SERPL DL<=0.01 NG/ML-MCNC: 0.1 NG/ML (ref 0–0.03)
TROPONIN I SERPL DL<=0.01 NG/ML-MCNC: 0.12 NG/ML (ref 0–0.03)
TV REST PULMONARY ARTERY PRESSURE: 52 MMHG

## 2022-02-03 PROCEDURE — 97530 THERAPEUTIC ACTIVITIES: CPT

## 2022-02-03 PROCEDURE — 97166 OT EVAL MOD COMPLEX 45 MIN: CPT

## 2022-02-03 PROCEDURE — 80048 BASIC METABOLIC PNL TOTAL CA: CPT | Performed by: NURSE PRACTITIONER

## 2022-02-03 PROCEDURE — 25000003 PHARM REV CODE 250: Performed by: NURSE PRACTITIONER

## 2022-02-03 PROCEDURE — 21400001 HC TELEMETRY ROOM

## 2022-02-03 PROCEDURE — 97162 PT EVAL MOD COMPLEX 30 MIN: CPT

## 2022-02-03 PROCEDURE — 25000003 PHARM REV CODE 250: Performed by: INTERNAL MEDICINE

## 2022-02-03 PROCEDURE — 84484 ASSAY OF TROPONIN QUANT: CPT | Performed by: INTERNAL MEDICINE

## 2022-02-03 PROCEDURE — 36415 COLL VENOUS BLD VENIPUNCTURE: CPT | Performed by: INTERNAL MEDICINE

## 2022-02-03 PROCEDURE — A4216 STERILE WATER/SALINE, 10 ML: HCPCS | Performed by: INTERNAL MEDICINE

## 2022-02-03 RX ORDER — DEXTROSE MONOHYDRATE 50 MG/ML
INJECTION, SOLUTION INTRAVENOUS CONTINUOUS
Status: DISCONTINUED | OUTPATIENT
Start: 2022-02-03 | End: 2022-02-03

## 2022-02-03 RX ORDER — DEXTROSE MONOHYDRATE 50 MG/ML
INJECTION, SOLUTION INTRAVENOUS CONTINUOUS
Status: ACTIVE | OUTPATIENT
Start: 2022-02-03 | End: 2022-02-04

## 2022-02-03 RX ADMIN — Medication 10 ML: at 09:02

## 2022-02-03 RX ADMIN — DEXTROSE: 5 SOLUTION INTRAVENOUS at 02:02

## 2022-02-03 NOTE — ASSESSMENT & PLAN NOTE
Will do renal ultrasound, gentle hydration, avoid nephrotoxic meds, follow CMP in AM     2/3/2022  Renal ultrasound stable   Creatinine trending downward,  2.4 today   Continue IVF's   Avoid Nephrotoxic agents

## 2022-02-03 NOTE — SUBJECTIVE & OBJECTIVE
Interval History: Pt refuse placement, reported to EPS.     Review of Systems   Constitutional: Negative for chills and fever.   HENT: Negative for congestion, rhinorrhea and sinus pressure.    Respiratory: Negative for apnea, cough, choking, chest tightness, shortness of breath, wheezing and stridor.    Cardiovascular: Negative for chest pain, palpitations and leg swelling.   Gastrointestinal: Negative for abdominal distention, abdominal pain, diarrhea, nausea and vomiting.   Endocrine: Negative for cold intolerance and heat intolerance.   Genitourinary: Negative for difficulty urinating and hematuria.   Musculoskeletal: Negative for arthralgias and joint swelling.   Skin: Positive for wound. Negative for color change, pallor and rash.   Neurological: Negative for dizziness, seizures, weakness, numbness and headaches.   Psychiatric/Behavioral: Negative for agitation. The patient is not nervous/anxious.      Objective:     Vital Signs (Most Recent):  Temp: 97.7 °F (36.5 °C) (02/03/22 0738)  Pulse: 93 (02/03/22 1300)  Resp: 18 (02/03/22 0738)  BP: 114/61 (02/03/22 0738)  SpO2: 99 % (02/03/22 0738) Vital Signs (24h Range):  Temp:  [97.5 °F (36.4 °C)-98.1 °F (36.7 °C)] 97.7 °F (36.5 °C)  Pulse:  [] 93  Resp:  [15-18] 18  SpO2:  [97 %-100 %] 99 %  BP: (113-137)/() 114/61     Weight: 43.5 kg (96 lb)  Body mass index is 15.98 kg/m².    Intake/Output Summary (Last 24 hours) at 2/3/2022 1441  Last data filed at 2/3/2022 0800  Gross per 24 hour   Intake 1692.54 ml   Output --   Net 1692.54 ml      Physical Exam  Vitals and nursing note reviewed.   Constitutional:       Appearance: She is underweight. She is ill-appearing.   HENT:      Right Ear: Tympanic membrane normal.   Eyes:      Pupils: Pupils are equal, round, and reactive to light.   Cardiovascular:      Rate and Rhythm: Normal rate.   Pulmonary:      Effort: Pulmonary effort is normal. No respiratory distress.      Breath sounds: No stridor. No wheezing,  rhonchi or rales.   Chest:      Chest wall: No tenderness.   Abdominal:      General: There is no distension.      Palpations: There is no mass.      Tenderness: There is no abdominal tenderness. There is no right CVA tenderness, left CVA tenderness, guarding or rebound.      Hernia: No hernia is present.   Musculoskeletal:      Cervical back: Normal range of motion.   Skin:     General: Skin is warm.      Findings: Erythema and lesion present.      Comments: See pic   Neurological:      Mental Status: She is alert. Mental status is at baseline.      Comments: Intermittent confusion          Significant Labs:   All pertinent labs within the past 24 hours have been reviewed.  CBC:   Recent Labs   Lab 02/02/22 2018   WBC 13.13*   HGB 13.5   HCT 42.7   *     CMP:   Recent Labs   Lab 02/02/22 2018 02/03/22  1320   * 151*   K 4.1 4.1   * 119*   CO2 20* 18*   GLU 90 91   BUN 99* 74*   CREATININE 3.1* 2.4*   CALCIUM 10.0 9.1   PROT 7.7  --    ALBUMIN 3.6  --    BILITOT 0.7  --    ALKPHOS 71  --    AST 16  --    ALT 15  --    ANIONGAP 20* 14   EGFRNONAA 13* 17*       Significant Imaging:     Imaging Results          X-Ray Chest 1 View (Final result)  Result time 02/02/22 21:09:15    Final result by Jackson Patel MD (02/02/22 21:09:15)                 Impression:      No acute abnormality.      Electronically signed by: Jorge Paz  Date:    02/02/2022  Time:    21:09             Narrative:    EXAMINATION:  XR CHEST 1 VIEW    CLINICAL HISTORY:  Weakness    TECHNIQUE:  Single frontal view of the chest was performed.    COMPARISON:  None    FINDINGS:  The lungs are clear, with normal appearance of pulmonary vasculature and no pleural effusion or pneumothorax.    The cardiac silhouette is normal in size. The hilar and mediastinal contours are unremarkable.    Bones are intact.  Senescent changes with atherosclerotic changes and basilar atelectasis.                               CT Head Without Contrast  (Final result)  Result time 02/02/22 19:25:58    Final result by Jackson Patel MD (02/02/22 19:25:58)                 Impression:      Atrophy and chronic white matter changes.    No hemorrhage mass effect or midline shift.    All CT scans   are performed using dose optimization techniques including the following: automated exposure control; adjustment of the mA and/or kV; use of iterative reconstruction technique.  Dose modulation was employed for ALARA by means of: Automated exposure control; adjustment of the mA and/or kV according to patient size (this includes techniques or standardized protocols for targeted exams where dose is matched to indication/reason for exam; i.e. extremities or head); and/or use of iterative reconstructive technique.      Electronically signed by: Jorge Paz  Date:    02/02/2022  Time:    19:25             Narrative:    EXAMINATION:  CT HEAD WITHOUT CONTRAST    CLINICAL HISTORY:  Mental status change, unknown cause;    TECHNIQUE:  Low dose axial CT images obtained throughout the head without intravenous contrast. Sagittal and coronal reconstructions were performed.    COMPARISON:  None.    FINDINGS:  Atrophy and chronic white matter changes.  No hemorrhage mass effect or midline shift.  Visualized paranasal sinuses clear.

## 2022-02-03 NOTE — PT/OT/SLP EVAL
"Physical Therapy Evaluation    Patient Name:  Tessie Champion   MRN:  [id number]    Recommendations:     Discharge Recommendations:  nursing facility, skilled   Discharge Equipment Recommendations: bedside commode,bath bench,walker, rolling   Barriers to discharge: Decreased caregiver support    Assessment:     Tessie Champion is a 89 y.o. female admitted with a medical diagnosis of Acute renal failure.  She presents with the following impairments/functional limitations:  weakness,impaired endurance,impaired functional mobilty,impaired balance,gait instability,decreased safety awareness,pain,decreased lower extremity function.    Rehab Prognosis: Good; patient would benefit from acute skilled PT services to address these deficits and reach maximum level of function.    Recent Surgery: * No surgery found *    Plan:     During this hospitalization, patient to be seen 3 x/week to address the identified rehab impairments via gait training,therapeutic activities,therapeutic exercises and progress toward the following goals:    · Plan of Care Expires:  02/17/22    Subjective     Chief Complaint: READY TO GO HOME  Patient/Family Comments/goals:   Pain/Comfort:  · Pain Rating 1: 0/10    Patients cultural, spiritual, Episcopal conflicts given the current situation:      Living Environment:  PT LIVES ALONE, SON STAYS WITH HER AT NIGHT, ALONE DURING THE DAY, NEIGHBORS CHECK IN, AMB INDEP IN HOME, DOES NOT DRIVE, SON PROVIDES GROCERIES, INDEP WITH ADL'S, DAUGHTER PRESENT AND REPORTS PT NEEDS MORE ASSISTANCE AND SPV AT HOME, REPORTS PT WILL NOT ALLOW OTHER TO HELP HER, "SHE HAS NOT BATHED IN PROBABLY 3 MONTHS"  Prior to admission, patients level of function was INDEP.  Equipment used at home: cane, straight.  DME owned (not currently used): none.  Upon discharge, patient will have assistance from ?.    Objective:     Communicated with NURSE LOZANO prior to session.  Patient found supine with telemetry,peripheral IV,bed alarm  " upon PT entry to room.    General Precautions: Standard, fall   Orthopedic Precautions:N/A   Braces: N/A  Respiratory Status: Room air    Exams:  · Cognitive Exam:  Patient is oriented to Person, Place, Time and Situation  · Postural Exam:  Patient presented with the following abnormalities:    · -       Rounded shoulders  · -       Forward head  · Sensation:    · -       Intact  · RLE ROM: WFL  · RLE Strength: GROSSLY 3+/5  · LLE ROM: WFL  · LLE Strength: GROSSLY 3+/5    Functional Mobility:  · Bed Mobility:     · Rolling Left:  maximal assistance  · Scooting: maximal assistance  · Supine to Sit: maximal assistance  · Transfers:     · Sit to Stand:  moderate assistance and of 2 persons with rolling walker  · Bed to Chair: moderate assistance and of 2 persons with  rolling walker  using  Step Transfer  · Gait: UNABLE TO PROGRESS TO GAIT AT THIS TIME DUE TO GENERALIZED WEAKNESS  · Balance: POOR    Therapeutic Activities and Exercises:   PT EDUCATED IN ROLE OF P.T. AND POC, PT EDUCATED IN RW USE AND SAFETY DURING TF'S AND GAIT, PT ENCOURAGED TO INCREASE TIME OOB IN CHAIR    AM-PAC 6 CLICK MOBILITY  Total Score:11     Patient left up in chair with all lines intact, call button in reach, NURSE notified, DAUGHTER present and SET UP FOR BREAKFAST.    GOALS:   Multidisciplinary Problems     Physical Therapy Goals        Problem: Physical Therapy Goal    Goal Priority Disciplines Outcome Goal Variances Interventions   Physical Therapy Goal     PT, PT/OT      Description: LTG'S TO BE MET IN 14 DAYS (2-17-22)  1. PT WILL REQUIRE [facility] FOR BED MOBILITY  2. PT WILL REQUIRE [facility] FOR TF'S  3. PT WILL ' WITH RW AND INO                   History:     Past Medical History:   Diagnosis Date    Hypertension        No past surgical history on file.    Time Tracking:     PT Received On: 02/03/22  PT Start Time: 0740     PT Stop Time: 0803  PT Total Time (min): 23 min     Billable Minutes: Evaluation 15 and Therapeutic Activity  8    02/03/2022

## 2022-02-03 NOTE — PLAN OF CARE
P.T. EVAL COMPLETE, PT CURRENTLY REQUIRES [facility] FOR BED MOBILITY, [facility] FOR BED<>CHAIR TF.  P.T. RECOMMENDS SNF

## 2022-02-03 NOTE — PT/OT/SLP EVAL
"Occupational Therapy   Evaluation    Name: Tessie Champion  MRN: [id number]  Admitting Diagnosis:  Acute renal failure  Recent Surgery: * No surgery found *      Recommendations:     Discharge Recommendations: nursing facility, skilled  Discharge Equipment Recommendations:  bedside commode,bath bench,walker, rolling  Barriers to discharge:  Decreased caregiver support    Assessment:     Tessie Champion is a 89 y.o. female with a medical diagnosis of Acute renal failure.  She presents with the following performance deficits affecting function: weakness,impaired endurance,impaired self care skills,impaired functional mobilty,impaired balance,impaired cognition,decreased safety awareness,impaired skin,impaired cardiopulmonary response to activity.      Rehab Prognosis: Good; patient would benefit from acute skilled OT services to address these deficits and reach maximum level of function.       Plan:     Patient to be seen 2 x/week to address the above listed problems via self-care/home management,therapeutic activities,therapeutic exercises  · Plan of Care Expires: 02/17/22  · Plan of Care Reviewed with: patient    Subjective     Chief Complaint: Reported "I am doing good today."  Patient/Family Comments/goals: DIL goal to improve "home situation" and increase strength    Patient a poor historian. PLOF obtained from DIL present in room.    Occupational Profile:  Living Environment: Patient resides with her son. Daughter in law ( for a different son) provides transportation.  Previous level of function: Patient with poor quality hygiene. Daughter in law states patient refuses help and hasn't had a proper bath in months. She walks in home without AD.  Roles and Routines: n/a  Equipment Used at Home:  cane, straight  Assistance upon Discharge: questionable A of family    Pain/Comfort:  · Pain Rating 1: 0/10    Objective:     Communicated with: NurseArlet, prior to session.  Patient found supine with " telemetry,peripheral IV,bed alarm upon OT entry to room.    General Precautions: Standard, fall   Orthopedic Precautions:N/A   Braces: N/A  Respiratory Status: Room air    Bed Mobility:    · Patient completed Supine to Sit with maximal assistance and with side rail    Functional Mobility/Transfers:  · Patient completed Sit <> Stand Transfer with moderate assistance and of 2 persons  with  rolling walker   · Patient completed Bed <> Chair Transfer using Step Transfer technique with moderate assistance and of 2 persons with rolling walker  · Functional Mobility: unable to complete at this time    Activities of Daily Living:  · Feeding:  setup .  · Grooming: moderate assistance .  · Upper Body Dressing: maximal assistance .    Cognitive/Visual Perceptual:  Cognitive/Psychosocial Skills:     -       Oriented to: Person and Place   -       Follows Commands/attention:Easily distracted and Follows one-step commands  -       Safety awareness/insight to disability: impaired     Physical Exam:  Balance:    -       sitting: fair, static standing: poor +, dynamic standing: poor  Dominant hand:    -       right  Upper Extremity Range of Motion:     -       Right Upper Extremity: WFL  -       Left Upper Extremity: WFL  Upper Extremity Strength:    -       Right Upper Extremity: WFL except grossly 4-/5  -       Left Upper Extremity: Deficits: grossly 4-/5   Strength:    -       Right Upper Extremity: Deficits: fair  -       Left Upper Extremity: Deficits: fair    AMPAC 6 Click ADL:  AMPAC Total Score: 13    Treatment & Education:  Patient and DIL educated on role of OT in acute setting and benefits of participation. Patient educated on safe mobility techniques to use to increase independence with mobility. Patient educated on need to call for A to transfer back to bed.    DIL spoke with therapist privately regarding concerns of home situation. Case management and MD informed and will address.  Education:    Patient left up in  chair with all lines intact and call button in reach    GOALS:   Multidisciplinary Problems     Occupational Therapy Goals        Problem: Occupational Therapy Goal    Goal Priority Disciplines Outcome Interventions   Occupational Therapy Goal     OT, PT/OT     Description: Goals to be met by: 2/17/22     Patient will increase functional independence with ADLs by performing:    UE Dressing with Set-up Assistance.  Toileting from bedside commode with Stand-by Assistance for hygiene and clothing management.   Toilet transfer to bedside commode with Stand-by Assistance.  Increased functional strength in B UE grossly by 1/2 MM grade.                     History:     Past Medical History:   Diagnosis Date    Hypertension        No past surgical history on file.    Time Tracking:     OT Date of Treatment: 02/03/22  OT Start Time: 0725  OT Stop Time: 0750  OT Total Time (min): 25 min    Billable Minutes:Evaluation 15  Therapeutic Activity 10    2/3/2022

## 2022-02-03 NOTE — HOSPITAL COURSE
Ms Champion is a 89 year old female who presented to [facility] for evaluation of weakness. Lives alone, she was noted to have multiple wounds and skin breakdown to sacral, heals and all over body. Case management is following, case has been reported to EPS. Creatinine 3.1 on admission, has trended downward to 2.4 with IVF's. Sodium 151, down for 153. Will monitor. As of 2/4/22 this morning patient had a brief episode of unresponsiveness, heart rate dropped into the 30's. Emesis was noted on gown concerns of aspiration but  CXR was negative.   Patient with intermittent confusion, she is not competent to make her own decisions. Discussed pt's condition and code status with her POA, DNR confirmed. As of 2/5/22 pt awake and alert. Family at bedside. Renal function continues to improve. Will start dysphagia diet per ST recommendations. Awaiting placement. As of 2/6/22 no change in status. Continue current plan. As of 2/7/22  pt having hypoglycemic episode despite being on D5 NS infusion. She has continued to have PO intake and thus has been suffering from hypoglycemia. Will have discussion with POA regarding plan of care. As of 2/8/22  Spoke with  family at length in regards to their options and wishes. We discussed feeding tube as a family and will not pursue this intervention. Family is agreeable to using hospice in a facility. POA son and daughter-in-law has elected for [facility]. Awaiting for acceptance from [facility]. As of 2/9/22 Family would like for patient to go to [facility] so services for hospice will now be through [facility]. Patient being discharged to [facility] with [facility].

## 2022-02-03 NOTE — ASSESSMENT & PLAN NOTE
Will consult case management .  Need close follow up   I was unable to get the family to hear their side of the story .  Will follow in AM
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
## 2022-02-03 NOTE — PLAN OF CARE
Plan of care reviewed with pt, verbalized understanding. A&O x2, disoriented to situation and time. Ivs intact, dry, and clean. NS infusing at 75 mL/hr. On room air. No pain reported. NS to ST on monitor. Bed alarm on. Foam dressings applied to sacrum, heels, and left arm. Wound care and social work consult placed. Waffle mattress and heel boots in place. Instructed to call for assistance. Hourly rounding completed.

## 2022-02-03 NOTE — PLAN OF CARE
OT edwin completed. Sup>sit with max A, sit>stand with mod A of 2, step>pivot to bedside chair with mod A of 2. Recommending SNF at d/c.
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
## 2022-02-03 NOTE — SUBJECTIVE & OBJECTIVE
Past Medical History:   Diagnosis Date    Hypertension        No past surgical history on file.    Review of patient's allergies indicates:   Allergen Reactions    Alendronate     Pcn [penicillins]     Sulfa (sulfonamide antibiotics)     Teriparatide        No current facility-administered medications on file prior to encounter.     Current Outpatient Medications on File Prior to Encounter   Medication Sig    levothyroxine sodium (LEVOTHYROXINE ORAL) Take 1 tablet by mouth once daily.    traMADol (ULTRAM-ER) 100 MG Tb24 Take 100 mg by mouth 2 (two) times daily.    vitamin D (VITAMIN D3) 1000 units Tab Take 1,000 Units by mouth once daily.     Family History    None       Tobacco Use    Smoking status: Former Smoker     Start date: 1973     Quit date: 1998     Years since quittin.6    Smokeless tobacco: Not on file   Substance and Sexual Activity    Alcohol use: Yes     Alcohol/week: 1.0 standard drink     Types: 1 Cans of beer per week    Drug use: Never    Sexual activity: Not Currently     Partners: Male     Review of Systems   Constitutional: Positive for activity change and appetite change. Negative for chills, diaphoresis, fatigue, fever and unexpected weight change.   HENT: Negative for congestion and dental problem.    Musculoskeletal: Positive for gait problem.   Skin: Positive for rash and wound.     Objective:     Vital Signs (Most Recent):  Temp: 98.1 °F (36.7 °C) (22)  Pulse: 88 (22)  Resp: 18 (22)  BP: 118/74 (22)  SpO2: 99 % (22) Vital Signs (24h Range):  Temp:  [97.5 °F (36.4 °C)-98.1 °F (36.7 °C)] 98.1 °F (36.7 °C)  Pulse:  [84-97] 88  Resp:  [15-18] 18  SpO2:  [97 %-100 %] 99 %  BP: (113-137)/() 118/74     Weight: 43.9 kg (96 lb 12.5 oz)  Body mass index is 16.11 kg/m².    Physical Exam  Vitals and nursing note reviewed.   HENT:      Right Ear: Tympanic membrane normal.   Eyes:      Pupils: Pupils are equal,  round, and reactive to light.   Cardiovascular:      Rate and Rhythm: Normal rate.   Pulmonary:      Effort: Pulmonary effort is normal.   Musculoskeletal:      Cervical back: Normal range of motion.   Skin:     General: Skin is warm.      Findings: Erythema and lesion present.      Comments: See pic   Neurological:      Mental Status: She is alert. Mental status is at baseline.      Comments: Intermittent confusion            CRANIAL NERVES     CN III, IV, VI   Pupils are equal, round, and reactive to light.                           Significant Labs:   All pertinent labs within the past 24 hours have been reviewed.  Blood Culture: No results for input(s): LABBLOO in the last 48 hours.  BMP:   Recent Labs   Lab 02/02/22 2018   GLU 90   *   K 4.1   *   CO2 20*   BUN 99*   CREATININE 3.1*   CALCIUM 10.0     CBC:   Recent Labs   Lab 02/02/22 2018   WBC 13.13*   HGB 13.5   HCT 42.7   *     CMP:   Recent Labs   Lab 02/02/22  2018   *   K 4.1   *   CO2 20*   GLU 90   BUN 99*   CREATININE 3.1*   CALCIUM 10.0   PROT 7.7   ALBUMIN 3.6   BILITOT 0.7   ALKPHOS 71   AST 16   ALT 15   ANIONGAP 20*   EGFRNONAA 13*       Significant Imaging: I have reviewed all pertinent imaging results/findings within the past 24 hours.

## 2022-02-03 NOTE — ASSESSMENT & PLAN NOTE
Will consult case management .  Need close follow up   I was unable to get the family to hear their side of the story .  Will follow in AM     2/3/2022  Case management following

## 2022-02-03 NOTE — ASSESSMENT & PLAN NOTE
Will continue hydration , check serum sodium in AM      2/3/2022  Sodium level 151 today   Change IVF's to D5W

## 2022-02-03 NOTE — PLAN OF CARE
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  Home Phone: [phone]  Relation: Daughter    Discharge Plan A: Home with family,Home Health  Discharge Plan B: Home with family,Home Health      [facility] - [facility] [address] [facility] [address] [address] [address]  [facility] [address]  Phone: [phone] Fax: [phone]      Initial Assessment (most recent)     Adult Discharge Assessment - 02/03/22 1020        Discharge Assessment    Assessment Type Discharge Planning Assessment     Confirmed/corrected address, phone number and insurance Yes     Confirmed Demographics Correct on Facesheet     Source of Information patient;family     When was your last doctors appointment? --   "july"    Communicated JUVENAL with patient/caregiver Yes     Reason For Admission Dehydration     Lives With child(vicki), adult     Facility Arrived From: Home     Do you expect to return to your current living situation? Yes     Do you have help at home or someone to help you manage your care at home? Yes     Who are your caregiver(s) and their phone number(s)? Stephen Champion     Prior to hospitilization cognitive status: Alert/Oriented     Current cognitive status: Alert/Oriented     Walking or Climbing Stairs Difficulty none     Dressing/Bathing Difficulty none     Equipment Currently Used at Home cane, straight     Readmission within 30 days? No     Patient currently being followed by outpatient case management? No     Do you " currently have service(s) that help you manage your care at home? No     Do you take prescription medications? Yes     Do you have prescription coverage? Yes     Do you have any problems affording any of your prescribed medications? No     Is the patient taking medications as prescribed? yes     Who is going to help you get home at discharge? Family     How do you get to doctors appointments? family or friend will provide     Are you on dialysis? No     Do you take coumadin? No     Discharge Plan A Home with family;Home Health     Discharge Plan B Home with family;Home Health     DME Needed Upon Discharge  none     Discharge Plan discussed with: Patient   Deborah, Daughter in law    Discharge Barriers Identified None        Relationship/Environment    Name(s) of Who Lives With Patient Stephen Champion               CM met with patient/Deborah (daughter in law) at the bedside to assess for discharge needs.  Patient lives at home and her son Stephen lives with her.  Stephen works during the day but is in the home at night.  Deborah assists with transportation to medical appointments and her son does the grocery shopping.  Patient was able to answer all questions asked.  CM asked patient about the sores on her body and she said she was not aware of them and denies any pain.  CM asked about bathing/grooming and patient stated that she takes a bath daily.  Deborah stated that patient does have a bedside commode at home that she uses.  Deborah stated that patient is resistant to help with bathing and housekeeping.  Deborah does want to try and get patient's home cleaned prior to her going back home.  Patient does not want placement of any type but was agreeable to home health services.  CM placed a call to EPS for home environment check.  CM will continue to follow for needs.  CM provided a transitional care folder, information on advanced directives, information on pharmacy bedside delivery, and discharge planning begins on  admission with contact information for any needs/questions.

## 2022-02-03 NOTE — PLAN OF CARE
POC reviewed with pt. Pt verbalizes understanding of POC. No questions at this time.  AAOx2. Disoriented to situation and time. NADN.  NSR on cardiac monitor.  D5 infusing at 75 mL/hour.  Pt remains free of falls. Patient turned and weight shifted frequently.   SCDs, heel boots, and waffle mattress in place.  Wound care nurse saw patient today.   Echo completed today.   No complaints at this time.  Safety measures in place. Will continue to monitor.  Hourly rounding completed.   Informed pt to call for assistance before getting up. Pt verbalizes understanding.
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
## 2022-02-03 NOTE — ASSESSMENT & PLAN NOTE
Please see pics in media, consult wound care .  Consult case management.  She will likely need placement      2/3/2022  Case management follow   Pt refuse placement  Reported to EPS

## 2022-02-03 NOTE — ED PROVIDER NOTES
SCRIBE #1 NOTE: I, Pete Alon, am scribing for, and in the presence of, Susan Monroy MD. I have scribed the entire note.       History     Chief Complaint   Patient presents with    Weakness     Pt presented to ED with c/o generalized weakness for unknown amount of time, pt had stage four ulcer to Rt ankle that appears to be fused to her socks      Review of patient's allergies indicates:   Allergen Reactions    Alendronate     Pcn [penicillins]     Sulfa (sulfonamide antibiotics)     Teriparatide          History of Present Illness     HPI    2022, 7:33 PM  History obtained from the patient      History of Present Illness: Tessie Champion is a 89 y.o. female patient with a PMHx of HTN who presents to the Emergency Department for evaluation of weakness. Pt states that she has not taken any medication for ulcers on feet. Pt lives alone. Pt denies any change in appetite. Symptoms are constant and moderate in severity. No mitigating or exacerbating factors reported. No associated sxs reported. Patient denies any fever, chills, HA, SOB, CP, and all other sxs at this time. No prior tx reported. No further complaints or concerns at this time.       Arrival mode: Ambulance service    PCP: Kadeem Fleming MD        Past Medical History:  Past Medical History:   Diagnosis Date    Hypertension        Past Surgical History:  No past surgical history on file.      Family History:  No family history on file.    Social History:  Social History     Tobacco Use    Smoking status: Former Smoker     Start date: 1973     Quit date: 1998     Years since quittin.6    Smokeless tobacco: Not on file   Substance and Sexual Activity    Alcohol use: Yes     Alcohol/week: 1.0 standard drink     Types: 1 Cans of beer per week    Drug use: Never    Sexual activity: Not Currently     Partners: Male        Review of Systems     Review of Systems   Constitutional: Negative for chills and fever.   HENT:  Negative for sore throat.    Respiratory: Negative for shortness of breath.    Cardiovascular: Negative for chest pain.   Gastrointestinal: Negative for nausea and vomiting.   Genitourinary: Negative for dysuria.   Musculoskeletal: Negative for back pain.   Skin: Negative for rash.        (+) Ulcers on both feet   Neurological: Positive for weakness.   Hematological: Does not bruise/bleed easily.   All other systems reviewed and are negative.     Physical Exam     Initial Vitals   BP Pulse Resp Temp SpO2   02/02/22 1850 02/02/22 1850 02/02/22 1850 02/02/22 1850 02/02/22 2030   127/72 96 18 97.5 °F (36.4 °C) 100 %      MAP       --                 Physical Exam  Nursing Notes and Vital Signs Reviewed.  Constitutional: Patient is in no acute distress. Pt is covered in stool that extends into diaper region. Pt is oriented to person and place but not time.  Head: Atraumatic. Normocephalic.  Eyes: PERRL. EOM intact. Conjunctivae are not pale. No scleral icterus.  ENT: Mucous membranes are moist. Oropharynx is clear and symmetric.    Neck: Supple. Full ROM. No lymphadenopathy.  Cardiovascular: Regular rate. Regular rhythm. No murmurs, rubs, or gallops. dopplerable dorsalis pedis pulse.  Pulmonary/Chest: No respiratory distress. Clear to auscultation bilaterally. No wheezing or rales.  Abdominal: Soft and non-distended.  There is no tenderness.  No rebound, guarding, or rigidity. Good bowel sounds.  Genitourinary: No CVA tenderness  Musculoskeletal: Moves all extremities. No obvious deformities. No edema. No calf tenderness.  Skin:  3 cm ulcer to anterior aspect of R ankle with foul smelling drainage, tendon visible. Pressure ulcer on left posterior ankle. Skin is dry and crusty. Extensive sacral and bilateral buttock abrasions and pressure ulcers with fibrinous exudate. Large erythematous plaque that appears to be fungal in nature to left chest wall inferior to breast.  Neurological:  Alert, awake, and appropriate.   Normal speech.  No acute focal neurological deficits are appreciated.  Psychiatric: Normal affect. Good eye contact. Appropriate in content.                             ED Course   Critical Care    Date/Time: 2/2/2022 10:04 PM  Performed by: Susan Monroy MD  Authorized by: Susan Monroy MD   Direct patient critical care time: 17 minutes  Additional history critical care time: 6 minutes  Ordering / reviewing critical care time: 7 minutes  Documentation critical care time: 8 minutes  Consulting other physicians critical care time: 4 minutes  Consult with family critical care time: 3 minutes  Total critical care time (exclusive of procedural time) : 45 minutes  Critical care time was exclusive of separately billable procedures and treating other patients and teaching time.  Critical care was necessary to treat or prevent imminent or life-threatening deterioration of the following conditions: NSTEMI.  Critical care was time spent personally by me on the following activities: development of treatment plan with patient or surrogate, blood draw for specimens, interpretation of cardiac output measurements, evaluation of patient's response to treatment, examination of patient, obtaining history from patient or surrogate, discussions with consultants, ordering and performing treatments and interventions, ordering and review of laboratory studies, ordering and review of radiographic studies, pulse oximetry, review of old charts and re-evaluation of patient's condition.        ED Vital Signs:  Vitals:    02/04/22 0356 02/04/22 0359 02/04/22 0753 02/04/22 0900   BP:  138/86 121/79    Pulse: 66 69 75 79   Resp:  18 16    Temp:  97.1 °F (36.2 °C) 96.4 °F (35.8 °C)    TempSrc:  Oral Axillary    SpO2:       Weight:  44.5 kg (98 lb 1.7 oz)     Height:        02/04/22 0903 02/04/22 0904 02/04/22 0931 02/04/22 1021   BP: 134/78  113/77    Pulse: 85 91 85 86   Resp: (!) 22  18 18   Temp:       TempSrc:       SpO2:    100%    Weight:       Height:        02/04/22 1027 02/04/22 1029 02/04/22 1100 02/04/22 1300   BP:       Pulse:  72 71 72   Resp:       Temp:  97.3 °F (36.3 °C)     TempSrc:  Axillary     SpO2: 98% 100%     Weight:       Height:        02/04/22 1312 02/04/22 1500 02/04/22 1601   BP:      Pulse: 77 70 77   Resp: 16  18   Temp: 96.9 °F (36.1 °C)  98.1 °F (36.7 °C)   TempSrc:   Oral   SpO2: 99%  100%   Weight:      Height:          Abnormal Lab Results:  Labs Reviewed   CBC W/ AUTO DIFFERENTIAL - Abnormal; Notable for the following components:       Result Value    WBC 13.13 (*)     MCHC 31.6 (*)     Platelets 136 (*)     Gran # (ANC) 11.1 (*)     Immature Grans (Abs) 0.07 (*)     Gran % 84.3 (*)     Lymph % 8.5 (*)     All other components within normal limits   COMPREHENSIVE METABOLIC PANEL - Abnormal; Notable for the following components:    Sodium 153 (*)     Chloride 113 (*)     CO2 20 (*)     BUN 99 (*)     Creatinine 3.1 (*)     Anion Gap 20 (*)     eGFR if  15 (*)     eGFR if non  13 (*)     All other components within normal limits   URINALYSIS - Abnormal; Notable for the following components:    Occult Blood UA Trace (*)     All other components within normal limits   B-TYPE NATRIURETIC PEPTIDE - Abnormal; Notable for the following components:     (*)     All other components within normal limits   TROPONIN I - Abnormal; Notable for the following components:    Troponin I 0.113 (*)     All other components within normal limits   CK   CK   SARS-COV-2 RNA AMPLIFICATION, QUAL        All Lab Results:  Results for orders placed or performed during the hospital encounter of 02/02/22   CBC auto differential   Result Value Ref Range    WBC 13.13 (H) 3.90 - 12.70 K/uL    RBC 4.70 4.00 - 5.40 M/uL    Hemoglobin 13.5 12.0 - 16.0 g/dL    Hematocrit 42.7 37.0 - 48.5 %    MCV 91 82 - 98 fL    MCH 28.7 27.0 - 31.0 pg    MCHC 31.6 (L) 32.0 - 36.0 g/dL    RDW 12.8 11.5 - 14.5 %    Platelets  136 (L) 150 - 450 K/uL    MPV 10.0 9.2 - 12.9 fL    Immature Granulocytes 0.5 0.0 - 0.5 %    Gran # (ANC) 11.1 (H) 1.8 - 7.7 K/uL    Immature Grans (Abs) 0.07 (H) 0.00 - 0.04 K/uL    Lymph # 1.1 1.0 - 4.8 K/uL    Mono # 0.9 0.3 - 1.0 K/uL    Eos # 0.0 0.0 - 0.5 K/uL    Baso # 0.02 0.00 - 0.20 K/uL    nRBC 0 0 /100 WBC    Gran % 84.3 (H) 38.0 - 73.0 %    Lymph % 8.5 (L) 18.0 - 48.0 %    Mono % 6.5 4.0 - 15.0 %    Eosinophil % 0.0 0.0 - 8.0 %    Basophil % 0.2 0.0 - 1.9 %    Differential Method Automated    Comprehensive metabolic panel   Result Value Ref Range    Sodium 153 (H) 136 - 145 mmol/L    Potassium 4.1 3.5 - 5.1 mmol/L    Chloride 113 (H) 95 - 110 mmol/L    CO2 20 (L) 23 - 29 mmol/L    Glucose 90 70 - 110 mg/dL    BUN 99 (H) 8 - 23 mg/dL    Creatinine 3.1 (H) 0.5 - 1.4 mg/dL    Calcium 10.0 8.7 - 10.5 mg/dL    Total Protein 7.7 6.0 - 8.4 g/dL    Albumin 3.6 3.5 - 5.2 g/dL    Total Bilirubin 0.7 0.1 - 1.0 mg/dL    Alkaline Phosphatase 71 55 - 135 U/L    AST 16 10 - 40 U/L    ALT 15 10 - 44 U/L    Anion Gap 20 (H) 8 - 16 mmol/L    eGFR if African American 15 (A) >60 mL/min/1.73 m^2    eGFR if non African American 13 (A) >60 mL/min/1.73 m^2   Urinalysis - Clean Catch   Result Value Ref Range    Specimen UA Urine, Catheterized     Color, UA Yellow Yellow, Straw, Emely    Appearance, UA Clear Clear    pH, UA 6.0 5.0 - 8.0    Specific Gravity, UA 1.025 1.005 - 1.030    Protein, UA Negative Negative    Glucose, UA Negative Negative    Ketones, UA Negative Negative    Bilirubin (UA) Negative Negative    Occult Blood UA Trace (A) Negative    Nitrite, UA Negative Negative    Urobilinogen, UA Negative <2.0 EU/dL    Leukocytes, UA Negative Negative   B-Type natriuretic peptide (BNP)   Result Value Ref Range     (H) 0 - 99 pg/mL   Troponin I   Result Value Ref Range    Troponin I 0.113 (H) 0.000 - 0.026 ng/mL   CK   Result Value Ref Range     20 - 180 U/L   COVID-19 Rapid Screening   Result Value Ref Range     SARS-CoV-2 RNA, Amplification, Qual Negative Negative   Troponin I   Result Value Ref Range    Troponin I 0.096 (H) 0.000 - 0.026 ng/mL   Troponin I   Result Value Ref Range    Troponin I 0.116 (H) 0.000 - 0.026 ng/mL   Basic Metabolic Panel   Result Value Ref Range    Sodium 151 (H) 136 - 145 mmol/L    Potassium 4.1 3.5 - 5.1 mmol/L    Chloride 119 (H) 95 - 110 mmol/L    CO2 18 (L) 23 - 29 mmol/L    Glucose 91 70 - 110 mg/dL    BUN 74 (H) 8 - 23 mg/dL    Creatinine 2.4 (H) 0.5 - 1.4 mg/dL    Calcium 9.1 8.7 - 10.5 mg/dL    Anion Gap 14 8 - 16 mmol/L    eGFR if African American 20 (A) >60 mL/min/1.73 m^2    eGFR if non African American 17 (A) >60 mL/min/1.73 m^2   Basic metabolic panel   Result Value Ref Range    Sodium 141 136 - 145 mmol/L    Potassium 3.5 3.5 - 5.1 mmol/L    Chloride 111 (H) 95 - 110 mmol/L    CO2 21 (L) 23 - 29 mmol/L    Glucose 123 (H) 70 - 110 mg/dL    BUN 58 (H) 8 - 23 mg/dL    Creatinine 1.8 (H) 0.5 - 1.4 mg/dL    Calcium 8.6 (L) 8.7 - 10.5 mg/dL    Anion Gap 9 8 - 16 mmol/L    eGFR if African American 28 (A) >60 mL/min/1.73 m^2    eGFR if non African American 24 (A) >60 mL/min/1.73 m^2   CBC Auto Differential   Result Value Ref Range    WBC 10.55 3.90 - 12.70 K/uL    RBC 4.28 4.00 - 5.40 M/uL    Hemoglobin 12.3 12.0 - 16.0 g/dL    Hematocrit 39.4 37.0 - 48.5 %    MCV 92 82 - 98 fL    MCH 28.7 27.0 - 31.0 pg    MCHC 31.2 (L) 32.0 - 36.0 g/dL    RDW 12.8 11.5 - 14.5 %    Platelets 102 (L) 150 - 450 K/uL    MPV 10.6 9.2 - 12.9 fL    Immature Granulocytes 0.6 (H) 0.0 - 0.5 %    Gran # (ANC) 8.5 (H) 1.8 - 7.7 K/uL    Immature Grans (Abs) 0.06 (H) 0.00 - 0.04 K/uL    Lymph # 1.2 1.0 - 4.8 K/uL    Mono # 0.7 0.3 - 1.0 K/uL    Eos # 0.1 0.0 - 0.5 K/uL    Baso # 0.01 0.00 - 0.20 K/uL    nRBC 0 0 /100 WBC    Gran % 80.7 (H) 38.0 - 73.0 %    Lymph % 11.0 (L) 18.0 - 48.0 %    Mono % 6.7 4.0 - 15.0 %    Eosinophil % 0.9 0.0 - 8.0 %    Basophil % 0.1 0.0 - 1.9 %    Differential Method Automated    Echo    Result Value Ref Range    BSA 1.41 m2    TDI SEPTAL 0.05 m/s    LV LATERAL E/E' RATIO 12.00 m/s    LV SEPTAL E/E' RATIO 12.00 m/s    LA WIDTH 2.22 cm    IVC diameter 0.75 cm    Left Ventricular Outflow Tract Mean Velocity 0.56968994289741 cm/s    Left Ventricular Outflow Tract Mean Gradient 2.08 mmHg    TDI LATERAL 0.05 m/s    LVIDd 2.31 (A) 3.5 - 6.0 cm    IVS 1.41 (A) 0.6 - 1.1 cm    Posterior Wall 1.37 (A) 0.6 - 1.1 cm    Ao root annulus 3.05 cm    LVIDs 1.66 (A) 2.1 - 4.0 cm    FS 28 28 - 44 %    LA volume 8.46 cm3    Sinus 3.10 cm    STJ 3.06 cm    Ascending aorta 2.93 cm    LV mass 100.06 g    LA size 1.81 cm    TAPSE 1.84 cm    Left Ventricle Relative Wall Thickness 1.19 cm    AV mean gradient 4 mmHg    AV valve area 1.85 cm2    AV Velocity Ratio 0.92     AV index (prosthetic) 0.94     MV valve area p 1/2 method 2.35 cm2    E/A ratio 0.58     Mean e' 0.05 m/s    E wave deceleration time 323.095662814272281 msec    IVRT 65.258081762852385 msec    LVOT diameter 1.58 cm    LVOT area 2.0 cm2    LVOT peak luis a 1.04 m/s    LVOT peak VTI 21.90 cm    Ao peak luis a 1.13 m/s    Ao VTI 23.2 cm    RVOT peak luis a 1.00 m/s    RVOT peak VTI 20.4 cm    LVOT stroke volume 42.92 cm3    AV peak gradient 5 mmHg    PV mean gradient 3.01 mmHg    E/E' ratio 12.00 m/s    MV Peak E Luis A 0.60 m/s    TR Max Luis A 3.50 m/s    MV stenosis pressure 1/2 time 93.426743160419849 ms    MV Peak A Luis A 1.03 m/s    LV Systolic Volume 7.93 mL    LV Systolic Volume Index 5.5 mL/m2    LV Diastolic Volume 18.37 mL    LV Diastolic Volume Index 12.67 mL/m2    LA Volume Index 5.8 mL/m2    LV Mass Index 69 g/m2    Echo EF Estimated 57 %    RA Major Axis 2.53 cm    Left Atrium Minor Axis 2.05 cm    Left Atrium Major Axis 3.13 cm    Triscuspid Valve Regurgitation Peak Gradient 49 mmHg    RA Width 1.60 cm    Right Atrial Pressure (from IVC) 3 mmHg    EF 65 %    TV rest pulmonary artery pressure 52 mmHg   POCT glucose   Result Value Ref Range    POCT Glucose 77  70 - 110 mg/dL         Imaging Results:  Imaging Results          X-Ray Chest 1 View (Final result)  Result time 02/02/22 21:09:15    Final result by Jackson Patel MD (02/02/22 21:09:15)                 Impression:      No acute abnormality.      Electronically signed by: Jorge Paz  Date:    02/02/2022  Time:    21:09             Narrative:    EXAMINATION:  XR CHEST 1 VIEW    CLINICAL HISTORY:  Weakness    TECHNIQUE:  Single frontal view of the chest was performed.    COMPARISON:  None    FINDINGS:  The lungs are clear, with normal appearance of pulmonary vasculature and no pleural effusion or pneumothorax.    The cardiac silhouette is normal in size. The hilar and mediastinal contours are unremarkable.    Bones are intact.  Senescent changes with atherosclerotic changes and basilar atelectasis.                               CT Head Without Contrast (Final result)  Result time 02/02/22 19:25:58    Final result by Jackson Patel MD (02/02/22 19:25:58)                 Impression:      Atrophy and chronic white matter changes.    No hemorrhage mass effect or midline shift.    All CT scans   are performed using dose optimization techniques including the following: automated exposure control; adjustment of the mA and/or kV; use of iterative reconstruction technique.  Dose modulation was employed for ALARA by means of: Automated exposure control; adjustment of the mA and/or kV according to patient size (this includes techniques or standardized protocols for targeted exams where dose is matched to indication/reason for exam; i.e. extremities or head); and/or use of iterative reconstructive technique.      Electronically signed by: Jorge Paz  Date:    02/02/2022  Time:    19:25             Narrative:    EXAMINATION:  CT HEAD WITHOUT CONTRAST    CLINICAL HISTORY:  Mental status change, unknown cause;    TECHNIQUE:  Low dose axial CT images obtained throughout the head without intravenous contrast. Sagittal and  coronal reconstructions were performed.    COMPARISON:  None.    FINDINGS:  Atrophy and chronic white matter changes.  No hemorrhage mass effect or midline shift.  Visualized paranasal sinuses clear.                                 The EKG was ordered, reviewed, and independently interpreted by the ED provider.  Interpretation time: 19:30  Rate: 90 BPM  Rhythm: normal sinus rhythm  Interpretation: T wave abnormality, consider inferolateral ischemia. Abnormal ECG. No STEMI.           The Emergency Provider reviewed the vital signs and test results, which are outlined above.     ED Discussion     10:02 PM: Discussed case with Josy Eastman NP ([facility]). Dr. Mayfield agrees with current care and management of pt and accepts admission.   Admitting Service: [facility]  Admitting Physician: Dr. Mayfield  Admit to: Inpatient tele    10:02 PM: Re-evaluated pt. I have discussed test results, shared treatment plan, and the need for admission with patient and family at bedside. Pt and family express understanding at this time and agree with all information. All questions answered. Pt and family have no further questions or concerns at this time. Pt is ready for admit.       Medical Decision Making:   Clinical Tests:   Lab Tests: Ordered and Reviewed  Radiological Study: Reviewed and Ordered  Medical Tests: Ordered and Reviewed           ED Medication(s):  Medications   sodium chloride 0.9% flush 10 mL (10 mLs Intravenous Not Given 2/4/22 1400)   melatonin tablet 6 mg (has no administration in time range)   polyethylene glycol packet 17 g (has no administration in time range)   acetaminophen tablet 650 mg (has no administration in time range)   HYDROcodone-acetaminophen 5-325 mg per tablet 1 tablet (has no administration in time range)   naloxone 0.4 mg/mL injection 0.02 mg (has no administration in time range)   glucose chewable tablet 16 g (has no administration in time range)   glucose chewable tablet 24 g (has  no administration in time range)   glucagon (human recombinant) injection 1 mg (has no administration in time range)   dextrose 10% bolus 125 mL (has no administration in time range)   dextrose 10% bolus 250 mL (has no administration in time range)   dextrose 5 % infusion ( Intravenous Verify Only 2/4/22 0641)   sars-cov-2 (covid-19) (Pfizer COVID-19) 30 mcg/0.3 ml injection 0.3 mL (has no administration in time range)   dextrose 5 % infusion ( Intravenous Restarted 2/4/22 1645)   sodium chloride 0.9% bolus 1,000 mL (0 mLs Intravenous Stopped 2/2/22 2240)   sodium chloride 0.9% bolus (250 mLs Intravenous New Bag 2/4/22 0907)       Current Discharge Medication List                  Scribe Attestation:   Scribe #1: I performed the above scribed service and the documentation accurately describes the services I performed. I attest to the accuracy of the note.     Attending:   Physician Attestation Statement for Scribe #1: I, Susan Monroy MD, personally performed the services described in this documentation, as scribed by Pete Chi, in my presence, and it is both accurate and complete.           Clinical Impression       ICD-10-CM ICD-9-CM   1. NSTEMI (non-ST elevated myocardial infarction)  I21.4 410.70   2. Weakness  R53.1 780.79   3. Pressure ulcers of skin of multiple topographic sites  L89.90 707.00     707.20   4. Elevated troponin  R77.8 790.6       Disposition:   Disposition: Admitted  Condition: Fair         Susan Monroy MD  02/04/22 1631

## 2022-02-04 PROBLEM — E87.0 HYPERNATREMIA: Status: RESOLVED | Noted: 2022-02-03 | Resolved: 2022-02-04

## 2022-02-04 LAB
ANION GAP SERPL CALC-SCNC: 9 MMOL/L (ref 8–16)
BASOPHILS # BLD AUTO: 0.01 K/UL (ref 0–0.2)
BASOPHILS NFR BLD: 0.1 % (ref 0–1.9)
BUN SERPL-MCNC: 58 MG/DL (ref 8–23)
CALCIUM SERPL-MCNC: 8.6 MG/DL (ref 8.7–10.5)
CHLORIDE SERPL-SCNC: 111 MMOL/L (ref 95–110)
CO2 SERPL-SCNC: 21 MMOL/L (ref 23–29)
CREAT SERPL-MCNC: 1.8 MG/DL (ref 0.5–1.4)
DIFFERENTIAL METHOD: ABNORMAL
EOSINOPHIL # BLD AUTO: 0.1 K/UL (ref 0–0.5)
EOSINOPHIL NFR BLD: 0.9 % (ref 0–8)
ERYTHROCYTE [DISTWIDTH] IN BLOOD BY AUTOMATED COUNT: 12.8 % (ref 11.5–14.5)
EST. GFR  (AFRICAN AMERICAN): 28 ML/MIN/1.73 M^2
EST. GFR  (NON AFRICAN AMERICAN): 24 ML/MIN/1.73 M^2
GLUCOSE SERPL-MCNC: 123 MG/DL (ref 70–110)
HCT VFR BLD AUTO: 39.4 % (ref 37–48.5)
HGB BLD-MCNC: 12.3 G/DL (ref 12–16)
IMM GRANULOCYTES # BLD AUTO: 0.06 K/UL (ref 0–0.04)
IMM GRANULOCYTES NFR BLD AUTO: 0.6 % (ref 0–0.5)
LYMPHOCYTES # BLD AUTO: 1.2 K/UL (ref 1–4.8)
LYMPHOCYTES NFR BLD: 11 % (ref 18–48)
MCH RBC QN AUTO: 28.7 PG (ref 27–31)
MCHC RBC AUTO-ENTMCNC: 31.2 G/DL (ref 32–36)
MCV RBC AUTO: 92 FL (ref 82–98)
MONOCYTES # BLD AUTO: 0.7 K/UL (ref 0.3–1)
MONOCYTES NFR BLD: 6.7 % (ref 4–15)
NEUTROPHILS # BLD AUTO: 8.5 K/UL (ref 1.8–7.7)
NEUTROPHILS NFR BLD: 80.7 % (ref 38–73)
NRBC BLD-RTO: 0 /100 WBC
PLATELET # BLD AUTO: 102 K/UL (ref 150–450)
PMV BLD AUTO: 10.6 FL (ref 9.2–12.9)
POCT GLUCOSE: 77 MG/DL (ref 70–110)
POTASSIUM SERPL-SCNC: 3.5 MMOL/L (ref 3.5–5.1)
RBC # BLD AUTO: 4.28 M/UL (ref 4–5.4)
SODIUM SERPL-SCNC: 141 MMOL/L (ref 136–145)
WBC # BLD AUTO: 10.55 K/UL (ref 3.9–12.7)

## 2022-02-04 PROCEDURE — 36415 COLL VENOUS BLD VENIPUNCTURE: CPT | Performed by: NURSE PRACTITIONER

## 2022-02-04 PROCEDURE — 97530 THERAPEUTIC ACTIVITIES: CPT

## 2022-02-04 PROCEDURE — 63600175 PHARM REV CODE 636 W HCPCS: Performed by: FAMILY MEDICINE

## 2022-02-04 PROCEDURE — 91300 PHARM REV CODE 636 W HCPCS: CPT | Performed by: FAMILY MEDICINE

## 2022-02-04 PROCEDURE — 21400001 HC TELEMETRY ROOM

## 2022-02-04 PROCEDURE — 85025 COMPLETE CBC W/AUTO DIFF WBC: CPT | Performed by: NURSE PRACTITIONER

## 2022-02-04 PROCEDURE — 0002A HC IMMUNIZ ADMIN, SARS-COV-2 COVID-19 VACC, 30MCG/0.3ML, 2ND DOSE: CPT | Performed by: FAMILY MEDICINE

## 2022-02-04 PROCEDURE — 97535 SELF CARE MNGMENT TRAINING: CPT

## 2022-02-04 PROCEDURE — 25000003 PHARM REV CODE 250: Performed by: FAMILY MEDICINE

## 2022-02-04 PROCEDURE — A4216 STERILE WATER/SALINE, 10 ML: HCPCS | Performed by: INTERNAL MEDICINE

## 2022-02-04 PROCEDURE — 25000003 PHARM REV CODE 250: Performed by: INTERNAL MEDICINE

## 2022-02-04 PROCEDURE — 80048 BASIC METABOLIC PNL TOTAL CA: CPT | Performed by: NURSE PRACTITIONER

## 2022-02-04 PROCEDURE — 25000003 PHARM REV CODE 250: Performed by: NURSE PRACTITIONER

## 2022-02-04 PROCEDURE — 27000221 HC OXYGEN, UP TO 24 HOURS

## 2022-02-04 RX ORDER — DEXTROSE MONOHYDRATE 50 MG/ML
INJECTION, SOLUTION INTRAVENOUS CONTINUOUS
Status: ACTIVE | OUTPATIENT
Start: 2022-02-04 | End: 2022-02-05

## 2022-02-04 RX ADMIN — Medication 10 ML: at 06:02

## 2022-02-04 RX ADMIN — MICONAZOLE NITRATE: 2 OINTMENT TOPICAL at 09:02

## 2022-02-04 RX ADMIN — DEXTROSE: 5 SOLUTION INTRAVENOUS at 06:02

## 2022-02-04 RX ADMIN — Medication 10 ML: at 10:02

## 2022-02-04 RX ADMIN — Medication 0.3 ML: at 04:02

## 2022-02-04 RX ADMIN — SODIUM CHLORIDE 250 ML: 0.9 INJECTION, SOLUTION INTRAVENOUS at 09:02

## 2022-02-04 NOTE — PT/OT/SLP PROGRESS
Occupational Therapy   Treatment    Name: Tessie Champion  MRN: [id number]  Admitting Diagnosis:  Acute renal failure       Recommendations:     Discharge Recommendations: nursing facility, skilled  Discharge Equipment Recommendations:  bedside commode,bath bench,walker, rolling  Barriers to discharge:  None    Assessment:     Tessie Champion is a 89 y.o. female with a medical diagnosis of Acute renal failure.  She presents with the following performance deficits affecting function are weakness,impaired endurance,impaired self care skills,impaired functional mobilty,impaired balance,impaired cognition,decreased safety awareness,pain,impaired skin,impaired cardiopulmonary response to activity.     Rehab Prognosis:  Good and Fair; patient would benefit from acute skilled OT services to address these deficits and reach maximum level of function.       Plan:     Patient to be seen 2 x/week to address the above listed problems via self-care/home management,therapeutic activities,therapeutic exercises  · Plan of Care Expires: 02/17/22  · Plan of Care Reviewed with: patient    Subjective     Pain/Comfort:  · Pain Rating 1: 6/10  · Location - Side 1: Right  · Location 1: foot  · Pain Addressed 1: Cessation of Activity (activity pacing)    Objective:     Communicated with: NurseArlet, prior to session.  Patient found supine with telemetry,peripheral IV,bed alarm upon OT entry to room.    General Precautions: Standard, fall   Orthopedic Precautions:N/A   Braces: N/A  Respiratory Status: Room air    Bed Mobility:    · Patient completed Supine to Sit with maximal assistance and with side rail     Functional Mobility/Transfers:  · Patient completed Sit <> Stand Transfer with maximal assistance and of 2 persons  with  rolling walker   · Patient completed Bed <> Chair Transfer using Stand Pivot technique with maximal assistance and of 2 persons with rolling walker  · Functional Mobility: unable    Activities of Daily  Living:  · Feeding:  setup self fed with R hand with increased time due to poor utensil management this date    [facility] 6 Click ADL: 13    Treatment & Education:  Patient with increased pain in R LE this date with touch and WB limited her ability to use with transfers. Required increased assistance with sit>stand and stand>pivot to bedside chair. Patient completed self feeding with therapist present from bedside chair. Educated on need to complete B UE AROM HEP BID to increase strength and activity tolerance. Reinforcement of all education required.    Patient educated on need to call for A to transfer back to bed. Stated understanding.    Patient left up in chair with all lines intact, call button in reach and nurse notifiedEducation:      GOALS:   Multidisciplinary Problems     Occupational Therapy Goals        Problem: Occupational Therapy Goal    Goal Priority Disciplines Outcome Interventions   Occupational Therapy Goal     OT, PT/OT Ongoing, Progressing    Description: Goals to be met by: 2/17/22     Patient will increase functional independence with ADLs by performing:    UE Dressing with Set-up Assistance.  Toileting from bedside commode with Stand-by Assistance for hygiene and clothing management.   Toilet transfer to bedside commode with Stand-by Assistance.  Increased functional strength in B UE grossly by 1/2 MM grade.                     Time Tracking:     OT Date of Treatment: 02/04/22  OT Start Time: 0740  OT Stop Time: 0805  OT Total Time (min): 25 min    Billable Minutes:Self Care/Home Management 10  Therapeutic Activity 15    2/4/2022

## 2022-02-04 NOTE — PLAN OF CARE
Patient with increased pain in R LE due to wound this date. Sup>sit with max A, sit>stand with max A of 2, stand>pivot due to poor tolerance with R LE WB with max A of 2. Recommending SNF at d/c.

## 2022-02-04 NOTE — ASSESSMENT & PLAN NOTE
Will do renal ultrasound, gentle hydration, avoid nephrotoxic meds, follow CMP in AM     2/4/2022  Creatinine trending downward, 1.8 today   Continue gentle IVF's   Avoid Nephrotoxic agents

## 2022-02-04 NOTE — NURSING
Per Jennifer, NP, patient with intermittent confusion and cannot make definite decision on her own. POA to make decision regarding DNR status. Will keep NP updated.

## 2022-02-04 NOTE — CODE/ RAPID DOCUMENTATION
Primary RN was called into room with HR dropped to 44; pt was slumped over in chair with emesis on self. Charge nurse was called to room, pt was unresponsive to sternal rub; pt put back in bed and rapid response called. After applying O2 via nonrebreather at 100%, pt became more responsive and began to follow commands.

## 2022-02-04 NOTE — PLAN OF CARE
Form 142 received    [facility] declined the referral.  RAFIQ contacted Deborah to discuss.  RAFIQ emailed a list of facilities to Deborah at [email or web address]

## 2022-02-04 NOTE — SUBJECTIVE & OBJECTIVE
Review of Systems   Unable to perform ROS: Mental status change     Objective:     Vital Signs (Most Recent):  Temp: 98.1 °F (36.7 °C) (02/04/22 1601)  Pulse: 77 (02/04/22 1601)  Resp: 18 (02/04/22 1601)  BP: 113/77 (02/04/22 0931)  SpO2: 100 % (02/04/22 1601) Vital Signs (24h Range):  Temp:  [96.4 °F (35.8 °C)-98.9 °F (37.2 °C)] 98.1 °F (36.7 °C)  Pulse:  [66-95] 77  Resp:  [14-22] 18  SpO2:  [98 %-100 %] 100 %  BP: (113-138)/(74-94) 113/77     Weight: 44.5 kg (98 lb 1.7 oz)  Body mass index is 16.33 kg/m².    Intake/Output Summary (Last 24 hours) at 2/4/2022 1611  Last data filed at 2/4/2022 0641  Gross per 24 hour   Intake 2018.57 ml   Output --   Net 2018.57 ml      Physical Exam  Vitals and nursing note reviewed.   Constitutional:       Appearance: She is cachectic. She is ill-appearing.      Comments: Unresponsive    HENT:      Right Ear: Tympanic membrane normal.   Eyes:      Pupils: Pupils are equal, round, and reactive to light.   Cardiovascular:      Rate and Rhythm: Normal rate.   Pulmonary:      Effort: Pulmonary effort is normal. No respiratory distress.      Breath sounds: No stridor. No wheezing, rhonchi or rales.   Chest:      Chest wall: No tenderness.   Abdominal:      General: There is no distension.      Palpations: There is no mass.      Tenderness: There is no abdominal tenderness. There is no right CVA tenderness, left CVA tenderness, guarding or rebound.      Hernia: No hernia is present.   Musculoskeletal:      Cervical back: Normal range of motion.   Skin:     General: Skin is warm.      Findings: Erythema and lesion present.      Comments: Right foot with ace wrap dressing inplace  Stage 3 pressure ulcers to bilateral buttocks    Neurological:      Mental Status: She is alert. She is confused.         Significant Labs:   All pertinent labs within the past 24 hours have been reviewed.  BMP:   Recent Labs   Lab 02/04/22  0632   *      K 3.5   *   CO2 21*   BUN 58*    CREATININE 1.8*   CALCIUM 8.6*     CBC:   Recent Labs   Lab 02/02/22 2018 02/04/22  1019   WBC 13.13* 10.55   HGB 13.5 12.3   HCT 42.7 39.4   * 102*     CMP:   Recent Labs   Lab 02/02/22 2018 02/03/22  1320 02/04/22  0632   * 151* 141   K 4.1 4.1 3.5   * 119* 111*   CO2 20* 18* 21*   GLU 90 91 123*   BUN 99* 74* 58*   CREATININE 3.1* 2.4* 1.8*   CALCIUM 10.0 9.1 8.6*   PROT 7.7  --   --    ALBUMIN 3.6  --   --    BILITOT 0.7  --   --    ALKPHOS 71  --   --    AST 16  --   --    ALT 15  --   --    ANIONGAP 20* 14 9   EGFRNONAA 13* 17* 24*     Troponin:   Recent Labs   Lab 02/02/22 2018 02/03/22  0614 02/03/22  1320   TROPONINI 0.113* 0.096* 0.116*       Significant Imaging: I have reviewed all pertinent imaging results/findings within the past 24 hours.

## 2022-02-04 NOTE — NURSING
PT got patient up to chair to eat breakfast. Monitor tech called reporting heart rate in the 30s. Checked on patient, she was up eating in chair, reporting and showing no signs of distress. Myself and student nurse went back in the room 5-10 minutes later to get a pulse oximetry reading and found her slumped over in the chair. Patient was not responsive to touch or verbal stimulation. Unable to palpate pulse or obtain a pulse oximetry reading. Charge nurse called into the room.

## 2022-02-04 NOTE — ASSESSMENT & PLAN NOTE
Will do echo, trend serial troponin, she denies chest pain at this time     Troponin trending down  Echo showed EF 65%, DD, Pulmonary HTN  Monitor
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
## 2022-02-04 NOTE — PLAN OF CARE
POC reviewed with pt and POA.  Patient awake and alert, but disoriented to time and situation.   NSR with some bradycardia on cardiac monitor.  Pt remains free of falls. Patient encouraged to weight shift regularly.   Waffle mattress, SCDs, and heel boots in place.   D5 infusing at 75 mL/hour.  Patient changed to NPO due to aspirations this am.  No complaints at this time.  Hourly rounding complete.  Safety measures in place. Will continue to monitor.

## 2022-02-04 NOTE — NURSING
Healthcare POA expressed concern about initiating a conversation with the patient about DNR status per MD recommendation. MD and NP notified via secure chat and asked for recommendations about how to approach the situation VS tele psych for competency. Awaiting response.

## 2022-02-04 NOTE — PT/OT/SLP PROGRESS
Physical Therapy  Treatment    Tessie Champion   MRN: [id number]   Admitting Diagnosis: Acute renal failure    PT Received On: 02/04/22  PT Start Time: 0730     PT Stop Time: 0753    PT Total Time (min): 23 min       Billable Minutes:  Therapeutic Activity 23    Treatment Type: Treatment  PT/PTA: PT     PTA Visit Number: 0       General Precautions: Standard, fall  Orthopedic Precautions: N/A   Braces: N/A  Respiratory Status: Room air    Subjective:  Communicated with NURSE BLAKE prior to session.  Pain/Comfort  Pain Rating 1: 6/10  Location - Side 1: Right  Location 1: foot  Pain Addressed 1: Reposition,Distraction    Objective:   Patient found with: bed alarm,telemetry,peripheral IV    Functional Mobility  Therapeutic Activities and Exercises:  PT FOUND SUPINE IN BED UPON ARRIVAL, AGREEABLE TO TX., C/O BEING COLD, SUP>SIT WITH MAXA, SEATED SCOOT TO EOB WITH MAXA AND EXTRA TIME, P SITTING BALANCE DUE TO GENERALIZED WEAKNESS, REVIEW RW USE AND SAFETY DURING TF'S AND GAIT, SIT>STAND WITH MAXA X 2 , PT PERFORMED STAND PIVOT TF FROM BED TO CHAIR WITH MAXA X 2, CUES FOR UPRIGHT POSTURE DUE TO FLEXED FWD OVER RW, DECREASED WB TO R FOOT DUE TO C/O PAIN, P DYNAMIC BALANCE, REVIEW BLE THEREX TO PERFORM WHILE SEATED IN CHAIR: HIP FLEX/EXT, LAQ, AP'S.  PT ENCOURAGED TO INCREASE TIME OOB IN CHAIR, PT SET UP FOR BREAKFAST    AM-PAC 6 CLICK MOBILITY  How much help from another person does this patient currently need?   1 = Unable, Total/Dependent Assistance  2 = A lot, Maximum/Moderate Assistance  3 = A little, Minimum/Contact Guard/Supervision  4 = None, Modified [place]/Independent    Turning over in bed (including adjusting bedclothes, sheets and blankets)?: 2  Sitting down on and standing up from a chair with arms (e.g., wheelchair, bedside commode, etc.): 2  Moving from lying on back to sitting on the side of the bed?: 2  Moving to and from a bed to a chair (including a wheelchair)?: 2  Need to walk in hospital room?:  1  Climbing 3-5 steps with a railing?: 1  Basic Mobility Total Score: 10    AM-PAC Raw Score CMS G-Code Modifier Level of Impairment Assistance   6 % Total / Unable   7 - 9 CM 80 - 100% Maximal Assist   10 - 14 CL 60 - 80% Moderate Assist   15 - 19 CK 40 - 60% Moderate Assist   20 - 22 CJ 20 - 40% Minimal Assist   23 CI 1-20% SBA / CGA   24 CH 0% Independent/ Mod I     Patient left up in chair with all lines intact, call button in reach and NURSE notified.    Assessment:  Tessie Champion is a 89 y.o. female with a medical diagnosis of Acute renal failure and presents with IMPAIRED FUNCTIONAL MOBILITY. PT WILL BENEFIT FROM CONT. SKILLED P.T. TO ADDRESS IMPAIRMENTS    Rehab identified problem list/impairments: Rehab identified problem list/impairments: weakness,impaired endurance,impaired functional mobilty,gait instability,impaired balance,decreased safety awareness,pain,decreased lower extremity function,decreased upper extremity function,impaired skin    Rehab potential is good.    Activity tolerance: FAIR    Discharge recommendations: Discharge Facility/Level of Care Needs: nursing facility, skilled     Barriers to discharge:      Equipment recommendations: Equipment Needed After Discharge: bedside commode,bath bench,wheelchair,walker, rolling     GOALS:   Multidisciplinary Problems     Physical Therapy Goals        Problem: Physical Therapy Goal    Goal Priority Disciplines Outcome Goal Variances Interventions   Physical Therapy Goal     PT, PT/OT Ongoing, Progressing     Description: LTG'S TO BE MET IN 14 DAYS (2-17-22)  1. PT WILL REQUIRE INO FOR BED MOBILITY  2. PT WILL REQUIRE INO FOR TF'S  3. PT WILL ' WITH RW AND INO                   PLAN:    Patient to be seen 3 x/week  to address the above listed problems via gait training,therapeutic activities,therapeutic exercises  Plan of Care expires: 02/17/22  Plan of Care reviewed with: patient    02/04/2022

## 2022-02-04 NOTE — PLAN OF CARE
Plan of care reviewed with pt. A&O x2, disoriented to time and situation. Iv intact, dry, and clean. D5 infusing at 75 mL/hr. On room air. Medications given with no complications. No pain reported. NS on monitor. Weight shift assistance provided. On waffle mattress, heel boots in place. SCDs in place. Bed alarm on. Instructed to call for assistance. Hourly rounding completed.,

## 2022-02-04 NOTE — ASSESSMENT & PLAN NOTE
Will consult case management .  Need close follow up   I was unable to get the family to hear their side of the story .  Will follow in AM     2/4/2022  Case management following

## 2022-02-04 NOTE — CHAPLAIN
Initial visit with patient and family.  Took time to visit with patient to assess for spiritual and emotional needs.  Pt and family were doing okay at the time of my visit but did ask for prayer.  I prayed for them before leaving and spiritual care remains available as needed.    Chaplain Rashaad Scanlon M.Div., [facility]

## 2022-02-04 NOTE — PLAN OF CARE
[facility] - Med S[facility] 3  Discharge Reassessment    Primary Care Provider: Kadeem Fleming MD    Expected Discharge Date:     Reassessment (most recent)     Discharge Reassessment - 02/04/22 1326        Discharge Reassessment    Assessment Type Discharge Planning Reassessment     Did the patient's condition or plan change since previous assessment? No     Discharge Plan discussed with: --   Deborah - Daughter in law    Communicated JUVENAL with patient/caregiver Yes     Discharge Plan A Skilled Nursing Facility     Discharge Plan B Skilled Nursing Facility     DME Needed Upon Discharge  none     Discharge Barriers Identified None        Post-Acute Status    Post-Acute Authorization Placement     Post-Acute Placement Status Referrals Sent             CM spoke to patient's daughter in law, Deborah, in regards to the recommendation of SNF.  Deborah stated that she has discussed with her  the need for SNF secondary to the patient's skin condition and inability to care for herself.  Deborah stated that her  spoke to patient's PCP, Dr Fleming, and he suggested that referrals be made to [facility] and [facility].  Patient's family wishes to proceed with referrals to both of the facilities for SNF placement with plan for likely converting to long term placement.  Choice form completed and placed in the patient blue folder.  Referrals made in [facility].

## 2022-02-05 LAB
ANION GAP SERPL CALC-SCNC: 9 MMOL/L (ref 8–16)
BUN SERPL-MCNC: 44 MG/DL (ref 8–23)
CALCIUM SERPL-MCNC: 8.8 MG/DL (ref 8.7–10.5)
CHLORIDE SERPL-SCNC: 104 MMOL/L (ref 95–110)
CO2 SERPL-SCNC: 22 MMOL/L (ref 23–29)
CREAT SERPL-MCNC: 1.6 MG/DL (ref 0.5–1.4)
EST. GFR  (AFRICAN AMERICAN): 32 ML/MIN/1.73 M^2
EST. GFR  (NON AFRICAN AMERICAN): 28 ML/MIN/1.73 M^2
GLUCOSE SERPL-MCNC: 112 MG/DL (ref 70–110)
POCT GLUCOSE: 103 MG/DL (ref 70–110)
POCT GLUCOSE: 140 MG/DL (ref 70–110)
POCT GLUCOSE: 64 MG/DL (ref 70–110)
POCT GLUCOSE: 66 MG/DL (ref 70–110)
POCT GLUCOSE: 97 MG/DL (ref 70–110)
POTASSIUM SERPL-SCNC: 3.4 MMOL/L (ref 3.5–5.1)
SODIUM SERPL-SCNC: 135 MMOL/L (ref 136–145)

## 2022-02-05 PROCEDURE — 80048 BASIC METABOLIC PNL TOTAL CA: CPT | Performed by: NURSE PRACTITIONER

## 2022-02-05 PROCEDURE — 25000003 PHARM REV CODE 250: Performed by: NURSE PRACTITIONER

## 2022-02-05 PROCEDURE — 36415 COLL VENOUS BLD VENIPUNCTURE: CPT | Performed by: NURSE PRACTITIONER

## 2022-02-05 PROCEDURE — 97530 THERAPEUTIC ACTIVITIES: CPT | Mod: CQ

## 2022-02-05 PROCEDURE — 21400001 HC TELEMETRY ROOM

## 2022-02-05 PROCEDURE — 25000003 PHARM REV CODE 250: Performed by: INTERNAL MEDICINE

## 2022-02-05 PROCEDURE — 92610 EVALUATE SWALLOWING FUNCTION: CPT

## 2022-02-05 PROCEDURE — A4216 STERILE WATER/SALINE, 10 ML: HCPCS | Performed by: INTERNAL MEDICINE

## 2022-02-05 RX ORDER — DEXTROSE MONOHYDRATE 50 MG/ML
INJECTION, SOLUTION INTRAVENOUS CONTINUOUS
Status: DISCONTINUED | OUTPATIENT
Start: 2022-02-05 | End: 2022-02-06

## 2022-02-05 RX ORDER — SODIUM,POTASSIUM PHOSPHATES 280-250MG
1 POWDER IN PACKET (EA) ORAL ONCE
Status: COMPLETED | OUTPATIENT
Start: 2022-02-05 | End: 2022-02-05

## 2022-02-05 RX ADMIN — POTASSIUM, SODIUM PHOSPHATES 280 MG-160 MG-250 MG ORAL POWDER PACKET 1 PACKET: POWDER IN PACKET at 03:02

## 2022-02-05 RX ADMIN — DEXTROSE 125 ML: 10 SOLUTION INTRAVENOUS at 12:02

## 2022-02-05 RX ADMIN — MICONAZOLE NITRATE: 2 OINTMENT TOPICAL at 08:02

## 2022-02-05 RX ADMIN — DEXTROSE: 5 SOLUTION INTRAVENOUS at 05:02

## 2022-02-05 RX ADMIN — Medication 10 ML: at 09:02

## 2022-02-05 NOTE — PT/OT/SLP PROGRESS
Physical Therapy  Treatment    Tessie Champion   MRN: [id number]   Admitting Diagnosis: Acute renal failure    PT Received On: 02/05/22  PT Start Time: 0815     PT Stop Time: 0845    PT Total Time (min): 30 min       Billable Minutes:  Therapeutic Activity 30    Treatment Type: Treatment  PT/PTA: PTA     PTA Visit Number: 1       General Precautions: Standard, fall  Orthopedic Precautions: N/A   Braces: N/A  Respiratory Status: Room air         Subjective:  Communicated with epic and nurse Mely,  prior to session.  Pt agreed to tx.     Pain/Comfort  Pain Rating 1: 0/10    Objective:   Patient found with: telemetry,peripheral IV (telesitter system)    Functional Mobility:  Bed Mobility:     Rolling: MAX A   Supine to sit: MAX A   Sit to supine: MAX A    Transfers:    Bed to chair, Chair to bed: MAX A  Via SQUAT pivot   Sit to stand: UNABLE    Gait:     Unable     Therapeutic Activities and Exercises:  Worked on static sitting at the eob with CGA to min a for balance. Significanltly leans fwd but corrected with cues. WOrked on improving posture with static sitting. Transferred to the chair and sat for LE exercises then transferred back to bed upon her nurses request. She apparently had a rapid response when OOB yesterday.      AM-PAC 6 CLICK MOBILITY  How much help from another person does this patient currently need?   1 = Unable, Total/Dependent Assistance  2 = A lot, Maximum/Moderate Assistance  3 = A little, Minimum/Contact Guard/Supervision  4 = None, Modified [place]/Independent    Turning over in bed (including adjusting bedclothes, sheets and blankets)?: 2  Sitting down on and standing up from a chair with arms (e.g., wheelchair, bedside commode, etc.): 2  Moving from lying on back to sitting on the side of the bed?: 2  Moving to and from a bed to a chair (including a wheelchair)?: 2  Need to walk in hospital room?: 1  Climbing 3-5 steps with a railing?: 1  Basic Mobility Total Score: 10    AM-PAC Raw  Score CMS G-Code Modifier Level of Impairment Assistance   6 % Total / Unable   7 - 9 CM 80 - 100% Maximal Assist   10 - 14 CL 60 - 80% Moderate Assist   15 - 19 CK 40 - 60% Moderate Assist   20 - 22 CJ 20 - 40% Minimal Assist   23 CI 1-20% SBA / CGA   24 CH 0% Independent/ Mod I     Patient left right sidelying with all lines intact, call button in reach, chair alarm on and nurse notified.    Assessment:  Tessie Champion is a 89 y.o. female with a medical diagnosis of Acute renal failure and presents with ongoing deficits. She is cooperative but weak. Will benefit from ongoing tx.    Rehab identified problem list/impairments: Rehab identified problem list/impairments: weakness,gait instability,decreased upper extremity function,decreased ROM,impaired cardiopulmonary response to activity,impaired endurance,impaired balance,decreased lower extremity function,impaired joint extensibility,impaired muscle length,decreased safety awareness,impaired self care skills,impaired cognition,impaired functional mobilty    Rehab potential is fair.    Activity tolerance: Fair    Discharge recommendations: Discharge Facility/Level of Care Needs: nursing facility, skilled     Barriers to discharge:      Equipment recommendations: Equipment Needed After Discharge: bedside commode,bath bench,walker, rolling     GOALS:   Multidisciplinary Problems     Physical Therapy Goals        Problem: Physical Therapy Goal    Goal Priority Disciplines Outcome Goal Variances Interventions   Physical Therapy Goal     PT, PT/OT Ongoing, Progressing     Description: LTG'S TO BE MET IN 14 DAYS (2-17-22)  1. PT WILL REQUIRE INO FOR BED MOBILITY  2. PT WILL REQUIRE INO FOR TF'S  3. PT WILL ' WITH RW AND INO                   PLAN:    Patient to be seen 3 x/week  to address the above listed problems via therapeutic activities,therapeutic exercises,gait training  Plan of Care expires: 02/17/22  Plan of Care reviewed with: patient          02/05/2022

## 2022-02-05 NOTE — PLAN OF CARE
Cardiac monitored , IVF as ordered, TQ2hr, Heels floated, waffle mattress inplace, barrier cream applied,antifungal cream applied as ordered, Pt remains free of injury, pain denied, no s/s of distress. 24 hour chart check completed. Will continue to monitor.

## 2022-02-05 NOTE — PLAN OF CARE
Problem: Skin Injury Risk Increased  Goal: Skin Health and Integrity  Outcome: Ongoing, Progressing  Plan of care reviewed with patient, pt verbalized understanding.  Pt remains free from falls this shift, fall precautions in place.  Wound care following pt.  AAOx1, VSS, NAD noted at this time.  Pt remained afebrile.  NSR on the tele monitor.  Blood glucose monitoring q6 hours.  D5 @ 75mL/hr.  Turned q2 hours.  Waffle mattress.  Heel boots.  Pt currently resting comfortably in bed.  Hourly rounding complete.  Will continue to monitor.
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
## 2022-02-05 NOTE — PLAN OF CARE
Pt tolerated tx fair. She continues to required MAX a for all bed mobility and bed to chair transfers.

## 2022-02-05 NOTE — ASSESSMENT & PLAN NOTE
Please see pics in media, consult wound care .  Consult case management.  She will likely need placement      2/3/2022  Case management follow   Pt refuse placement  Reported to EPS    Continue wound care

## 2022-02-05 NOTE — CONSULTS
[facility]  Wound Care    Patient Name:  Tessie Champion   MRN:  [id number]  Date: 2022  Diagnosis: Acute renal failure    History:     Past Medical History:   Diagnosis Date    Hypertension        Social History     Socioeconomic History    Marital status: Single   Tobacco Use    Smoking status: Former Smoker     Start date: 1973     Quit date: 1998     Years since quittin.6   Substance and Sexual Activity    Alcohol use: Yes     Alcohol/week: 1.0 standard drink     Types: 1 Cans of beer per week    Drug use: Never    Sexual activity: Not Currently     Partners: Male       Precautions:     Allergies as of 2022 - Reviewed 2022   Allergen Reaction Noted    Alendronate  2015    Pcn [penicillins]  2018    Sulfa (sulfonamide antibiotics)  2018    Teriparatide  2015       [facility] Assessment Details/Treatment                                 Consulted to this 89 year old female patient. PMHx of HTN- admitted with renal failure. She is awake and alert, pleasantly confused- assessment performed. Right achilles with unstageable pressure injury measuring 2x2x0.1cm with moist yellow adherent slough, surrounding erythema. Cleansed with saline and patted dry, hydrofiber applied then covered with non adherent foam. Right dorsal 2.5x2.5x0.1cm foot with full thickness wound, possibly pressure but unknown source- moist white tendon with red full thickness edges. Cleansed with saline and patted dry. Hydrogel applied over tendon, covered with hydrofiber and non adherent foam dressing. Secured all with rolled gauze and ACE. Patient with extensive severe IAD/stage 3 pressure injury to outer labia, medardo area, sacrum and buttocks. Scattered full thickness tissue loss that is moist and red with some scant yellow slough. Surrounding fungal rash. Also with severe IAD/fungal rash to left breast/breast fold. Will order antifungal for these. Patient on waffle mattress overlay  with heel boots in place. Will follow closely.    Recommendations made to primary team for wound care . Orders placed.     02/04/2022

## 2022-02-05 NOTE — ASSESSMENT & PLAN NOTE
Will do echo, trend serial troponin, she denies chest pain at this time     Troponin trending down  Echo showed EF 65%, DD, Pulmonary HTN  Monitor     No further evaluation warranted, outpt cardiology follow-up

## 2022-02-05 NOTE — PT/OT/SLP EVAL
Speech Language Pathology Evaluation  Bedside Swallow    Patient Name:  Tessie Champion   MRN:  [id number]  Admitting Diagnosis: Acute renal failure    Recommendations:                 General Recommendations:  Dysphagia therapy  Diet recommendations:  Puree, Nectar Thick   Aspiration Precautions: Assistance with meals and Assistance with thickening liquids, Eliminate distractions, Remain upright 30 minutes post meal, Small bites/sips and Strict aspiration precautions   General Precautions: Standard, aspiration  Communication strategies:  provide increased time to answer    History:     Past Medical History:   Diagnosis Date    Hypertension        Subjective     Patient alert, cooperative, and seen at bedside. Nursing reported patient pocketing food.    Pain/Comfort:  · Pain Rating 1: 0/10    Respiratory Status: Room air    Objective:     Oral Musculature Evaluation  · Oral Musculature: general weakness,unable to assess due to poor participation/comprehension  · Dentition:  (upper edentulous; lower intact dentition)  · Mucosal Quality: cracked    Bedside Swallow Eval:   Consistencies Assessed:  · Thin liquids via spoon  · Nectar thick liquids via cup  · Honey thick liquids via cup  · Puree textured     Oral Phase:   · Oral residue  · Slow oral transit time    Pharyngeal Phase:   · coughing/choking  · decreased hyolaryngeal excursion to palpation  · delayed swallow initation  · multiple spontaneous swallows      Treatment: Bedside swallowing evaluation completed. Patient exhibited difficulty following simple directives for oral mech examination. Observed po intake of thin liquids via spoon, nectar thick liquids via cup, honey thick liquids via cup, and textured puree. Patient exhibited delayed and reduced laryngeal elevation upon manual palpitation. She presented with an immediate wet cough following trials of thin liquids via spoon. She exhibited an inconsistent dry delayed cough with spontaneous second swallow for  all consistencies. Slow a-p transit and oral residue present with puree requiring cues for liquid wash. Recommend puree and nectar thick liquids with strict aspiration precautions. Patient may benefit from MBSS due to deficits listed above.    Assessment:     Tessie Champion is a 89 y.o. female with an SLP diagnosis of Dysphagia.  She presents with oral residue, slow a-p transit, delayed swallow initiation, and decrease laryngeal elevation.    Goals:   Multidisciplinary Problems     SLP Goals        Problem: SLP Goal    Goal Priority Disciplines Outcome   SLP Goal     SLP    Description: TPW tolerate bedside trials for appropriate diet upgrade    TPW tolerate the least restrictive diet without any overt s/s of aspiration                   Plan:     · Patient to be seen:  3 x/week   · Plan of Care expires:     · Plan of Care reviewed with:  patient   · SLP Follow-Up:  Yes         Time Tracking:     SLP Treatment Date:   02/05/22  Speech Start Time:  1053  Speech Stop Time:  1116     Speech Total Time (min):  23 min    Billable Minutes: Eval Swallow and Oral Function 19    02/05/2022

## 2022-02-05 NOTE — ASSESSMENT & PLAN NOTE
Will do renal ultrasound, gentle hydration, avoid nephrotoxic meds, follow CMP in AM     2/5/2022  Renal function continues to improve   Continue gentle IVF's   Avoid Nephrotoxic agents

## 2022-02-05 NOTE — SUBJECTIVE & OBJECTIVE
Review of Systems   Constitutional: Positive for activity change and appetite change. Negative for chills, diaphoresis, fatigue, fever and unexpected weight change.   HENT: Negative for congestion and dental problem.    Musculoskeletal: Positive for gait problem.   Skin: Positive for rash and wound.     Objective:     Vital Signs (Most Recent):  Temp: 98.5 °F (36.9 °C) (02/05/22 1117)  Pulse: 73 (02/05/22 1117)  Resp: 17 (02/05/22 1117)  BP: 126/75 (02/05/22 1117)  SpO2: (!) 92 % (02/05/22 1117) Vital Signs (24h Range):  Temp:  [98 °F (36.7 °C)-98.5 °F (36.9 °C)] 98.5 °F (36.9 °C)  Pulse:  [63-87] 73  Resp:  [16-20] 17  SpO2:  [92 %-100 %] 92 %  BP: (120-153)/(63-86) 126/75     Weight: 47.9 kg (105 lb 9.6 oz)  Body mass index is 17.57 kg/m².    Intake/Output Summary (Last 24 hours) at 2/5/2022 1427  Last data filed at 2/5/2022 0604  Gross per 24 hour   Intake 2343.73 ml   Output --   Net 2343.73 ml      Physical Exam  Vitals and nursing note reviewed.   Constitutional:       General: She is awake.      Appearance: She is cachectic. She is ill-appearing.   HENT:      Right Ear: Tympanic membrane normal.   Eyes:      Pupils: Pupils are equal, round, and reactive to light.   Cardiovascular:      Rate and Rhythm: Normal rate.   Pulmonary:      Effort: Pulmonary effort is normal. No respiratory distress.      Breath sounds: No stridor. No wheezing, rhonchi or rales.   Chest:      Chest wall: No tenderness.   Abdominal:      General: There is no distension.      Palpations: There is no mass.      Tenderness: There is no abdominal tenderness. There is no right CVA tenderness, left CVA tenderness, guarding or rebound.      Hernia: No hernia is present.   Musculoskeletal:      Cervical back: Normal range of motion.   Skin:     General: Skin is warm.      Findings: Erythema and lesion present.             Comments: Right foot with ace wrap dressing inplace  Stage 3 pressure ulcers to bilateral buttocks   Lesions to outer  labia, medardo area        Neurological:      Mental Status: She is alert.         Significant Labs:   All pertinent labs within the past 24 hours have been reviewed.  Blood Culture: No results for input(s): LABBLOO in the last 48 hours.  BMP:   Recent Labs   Lab 02/05/22  1014   *   *   K 3.4*      CO2 22*   BUN 44*   CREATININE 1.6*   CALCIUM 8.8     CBC:   Recent Labs   Lab 02/04/22  1019   WBC 10.55   HGB 12.3   HCT 39.4   *     CMP:   Recent Labs   Lab 02/04/22  0632 02/05/22  1014    135*   K 3.5 3.4*   * 104   CO2 21* 22*   * 112*   BUN 58* 44*   CREATININE 1.8* 1.6*   CALCIUM 8.6* 8.8   ANIONGAP 9 9   EGFRNONAA 24* 28*       Significant Imaging: I have reviewed all pertinent imaging results/findings within the past 24 hours.

## 2022-02-06 LAB
ANION GAP SERPL CALC-SCNC: 11 MMOL/L (ref 8–16)
BUN SERPL-MCNC: 30 MG/DL (ref 8–23)
CALCIUM SERPL-MCNC: 8.6 MG/DL (ref 8.7–10.5)
CHLORIDE SERPL-SCNC: 97 MMOL/L (ref 95–110)
CO2 SERPL-SCNC: 23 MMOL/L (ref 23–29)
CREAT SERPL-MCNC: 1.4 MG/DL (ref 0.5–1.4)
EST. GFR  (AFRICAN AMERICAN): 38 ML/MIN/1.73 M^2
EST. GFR  (NON AFRICAN AMERICAN): 33 ML/MIN/1.73 M^2
GLUCOSE SERPL-MCNC: 111 MG/DL (ref 70–110)
POCT GLUCOSE: 104 MG/DL (ref 70–110)
POCT GLUCOSE: 106 MG/DL (ref 70–110)
POCT GLUCOSE: 61 MG/DL (ref 70–110)
POCT GLUCOSE: 80 MG/DL (ref 70–110)
POTASSIUM SERPL-SCNC: 3.6 MMOL/L (ref 3.5–5.1)
SODIUM SERPL-SCNC: 131 MMOL/L (ref 136–145)

## 2022-02-06 PROCEDURE — 21400001 HC TELEMETRY ROOM

## 2022-02-06 PROCEDURE — A4216 STERILE WATER/SALINE, 10 ML: HCPCS | Performed by: INTERNAL MEDICINE

## 2022-02-06 PROCEDURE — 80048 BASIC METABOLIC PNL TOTAL CA: CPT | Performed by: NURSE PRACTITIONER

## 2022-02-06 PROCEDURE — 25000003 PHARM REV CODE 250: Performed by: INTERNAL MEDICINE

## 2022-02-06 PROCEDURE — 92526 ORAL FUNCTION THERAPY: CPT

## 2022-02-06 PROCEDURE — 25000003 PHARM REV CODE 250: Performed by: NURSE PRACTITIONER

## 2022-02-06 PROCEDURE — 36415 COLL VENOUS BLD VENIPUNCTURE: CPT | Performed by: NURSE PRACTITIONER

## 2022-02-06 RX ORDER — DEXTROSE MONOHYDRATE 50 MG/ML
INJECTION, SOLUTION INTRAVENOUS CONTINUOUS
Status: DISCONTINUED | OUTPATIENT
Start: 2022-02-06 | End: 2022-02-07

## 2022-02-06 RX ADMIN — DEXTROSE: 5 SOLUTION INTRAVENOUS at 04:02

## 2022-02-06 RX ADMIN — MICONAZOLE NITRATE: 2 OINTMENT TOPICAL at 09:02

## 2022-02-06 RX ADMIN — Medication 10 ML: at 09:02

## 2022-02-06 RX ADMIN — Medication 10 ML: at 05:02

## 2022-02-06 NOTE — PLAN OF CARE
Pt remains fall free this shift.  Pt AAOx1, person only, verbal, clear speech.  Skin warm and dry. No new skin issues.  Telemonitoring in progress, 70 SR  Room air  IV fluids  Assist x2 with transfers.  CBG q 6 hrs with PRN SS insulin.   Bed low, side rails up x 2, wheels locked, call light in reach.   Bed alarm maintained for safety.   Patient instructed to call for assistance.   Hourly rounding completed.   24 hour chart check completed  POC updated and reviewed with pt. Will continue POC.

## 2022-02-06 NOTE — PT/OT/SLP PROGRESS
Speech Language Pathology Treatment    Patient Name:  Tessie Champion   MRN:  [id number]  Admitting Diagnosis: Acute renal failure    Recommendations:                 General Recommendations:  Dysphagia therapy  Diet recommendations:  Puree, Liquid Diet Level: Nectar Thick   Aspiration Precautions: Assistance with meals and Assistance with thickening liquids   General Precautions: Standard, aspiration  Communication strategies:  provide increased time to answer    Subjective     Patient alert, cooperative, and seen at bedside.    Pain/Comfort:  · Pain Rating 1: 0/10  · Pain Rating Post-Intervention 1: 0/10    Respiratory Status: Room air    Objective:     Has the patient been evaluated by SLP for swallowing?   Yes  Keep patient NPO? No   Current Respiratory Status:        Swallow reassessment completed. Patient tolerated po trials of nectar thick liquids via cup without any overt s/s of aspiration. She refused trials of puree despite encouragement. Recommend continue current diet with assistance provided for meals     Assessment:     Tessie Champion is a 89 y.o. female with an SLP diagnosis of Dysphagia.  She presents with oropharyngeal dysphagia.    Goals:   Multidisciplinary Problems     SLP Goals        Problem: SLP Goal    Goal Priority Disciplines Outcome   SLP Goal     SLP Ongoing, Progressing   Description: TPW tolerate bedside trials for appropriate diet upgrade    TPW tolerate the least restrictive diet without any overt s/s of aspiration                   Plan:     · Patient to be seen:  3 x/week   · Plan of Care expires:     · Plan of Care reviewed with:  patient   · SLP Follow-Up:  Yes         Time Tracking:     SLP Treatment Date:   02/06/22  Speech Start Time:  1032  Speech Stop Time:  1049     Speech Total Time (min):  17 min    Billable Minutes: Treatment Swallowing Dysfunction 15    02/06/2022

## 2022-02-06 NOTE — ASSESSMENT & PLAN NOTE
Will do renal ultrasound, gentle hydration, avoid nephrotoxic meds, follow CMP in AM     2/6/2022  BMP pending   Continue gentle IVF's   Avoid Nephrotoxic agents

## 2022-02-06 NOTE — ACP (ADVANCE CARE PLANNING)
Advance Care Planning     Date: 02/05/2022          Code Status: DNR; status confirmed/order placed in chart       Goals of care: The family and healthcare power of   endorses that what is most important right now is to focus on improvement in condition but with limits to invasive therapies    Accordingly, we have decided that the best plan to meet the patient's goals includes continuing with treatment        Length of ACP   conversation in minutes: 30 minutes

## 2022-02-06 NOTE — SUBJECTIVE & OBJECTIVE
Review of Systems   Constitutional: Positive for activity change and appetite change. Negative for chills, diaphoresis, fatigue, fever and unexpected weight change.   HENT: Negative for congestion and dental problem.    Musculoskeletal: Positive for gait problem.   Skin: Positive for rash and wound.     Objective:     Vital Signs (Most Recent):  Temp: 97.6 °F (36.4 °C) (02/06/22 1138)  Pulse: 74 (02/06/22 1138)  Resp: 17 (02/06/22 1138)  BP: 125/68 (02/06/22 1138)  SpO2: 100 % (02/06/22 1138) Vital Signs (24h Range):  Temp:  [97.6 °F (36.4 °C)-98.7 °F (37.1 °C)] 97.6 °F (36.4 °C)  Pulse:  [74-94] 74  Resp:  [16-20] 17  SpO2:  [99 %-100 %] 100 %  BP: ()/(58-78) 125/68     Weight: 48 kg (105 lb 13.1 oz)  Body mass index is 17.61 kg/m².    Intake/Output Summary (Last 24 hours) at 2/6/2022 1238  Last data filed at 2/6/2022 0516  Gross per 24 hour   Intake 1443.24 ml   Output --   Net 1443.24 ml      Physical Exam  Vitals and nursing note reviewed.   Constitutional:       General: She is awake.      Appearance: She is cachectic. She is ill-appearing.   HENT:      Right Ear: Tympanic membrane normal.   Eyes:      Pupils: Pupils are equal, round, and reactive to light.   Cardiovascular:      Rate and Rhythm: Normal rate.   Pulmonary:      Effort: Pulmonary effort is normal. No respiratory distress.      Breath sounds: No stridor. No wheezing, rhonchi or rales.   Chest:      Chest wall: No tenderness.   Abdominal:      General: There is no distension.      Palpations: There is no mass.      Tenderness: There is no abdominal tenderness. There is no right CVA tenderness, left CVA tenderness, guarding or rebound.      Hernia: No hernia is present.   Musculoskeletal:      Cervical back: Normal range of motion.   Skin:     General: Skin is warm.      Findings: Erythema and lesion present.             Comments: Right foot with ace wrap dressing inplace  Stage 3 pressure ulcers to bilateral buttocks   Lesions to outer  labia, medardo area        Neurological:      Mental Status: She is alert.         Significant Labs:   All pertinent labs within the past 24 hours have been reviewed.  BMP:   Recent Labs   Lab 02/05/22  1014   *   *   K 3.4*      CO2 22*   BUN 44*   CREATININE 1.6*   CALCIUM 8.8     CBC: No results for input(s): WBC, HGB, HCT, PLT in the last 48 hours.  CMP:   Recent Labs   Lab 02/05/22  1014   *   K 3.4*      CO2 22*   *   BUN 44*   CREATININE 1.6*   CALCIUM 8.8   ANIONGAP 9   EGFRNONAA 28*       Significant Imaging: I have reviewed all pertinent imaging results/findings within the past 24 hours.
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
## 2022-02-07 PROBLEM — E16.2 HYPOGLYCEMIA: Status: ACTIVE | Noted: 2022-02-07

## 2022-02-07 LAB
POCT GLUCOSE: 113 MG/DL (ref 70–110)
POCT GLUCOSE: 38 MG/DL (ref 70–110)
POCT GLUCOSE: 55 MG/DL (ref 70–110)
POCT GLUCOSE: 62 MG/DL (ref 70–110)
POCT GLUCOSE: 97 MG/DL (ref 70–110)

## 2022-02-07 PROCEDURE — 63600175 PHARM REV CODE 636 W HCPCS: Performed by: NURSE PRACTITIONER

## 2022-02-07 PROCEDURE — 21400001 HC TELEMETRY ROOM

## 2022-02-07 PROCEDURE — A4216 STERILE WATER/SALINE, 10 ML: HCPCS | Performed by: INTERNAL MEDICINE

## 2022-02-07 PROCEDURE — 97535 SELF CARE MNGMENT TRAINING: CPT

## 2022-02-07 PROCEDURE — 97530 THERAPEUTIC ACTIVITIES: CPT

## 2022-02-07 PROCEDURE — 97116 GAIT TRAINING THERAPY: CPT

## 2022-02-07 PROCEDURE — 25000003 PHARM REV CODE 250: Performed by: INTERNAL MEDICINE

## 2022-02-07 RX ORDER — DEXTROSE MONOHYDRATE AND SODIUM CHLORIDE 5; .9 G/100ML; G/100ML
INJECTION, SOLUTION INTRAVENOUS CONTINUOUS
Status: DISCONTINUED | OUTPATIENT
Start: 2022-02-07 | End: 2022-02-10 | Stop reason: HOSPADM

## 2022-02-07 RX ADMIN — Medication 10 ML: at 09:02

## 2022-02-07 RX ADMIN — DEXTROSE 125 ML: 10 SOLUTION INTRAVENOUS at 01:02

## 2022-02-07 RX ADMIN — Medication 10 ML: at 05:02

## 2022-02-07 RX ADMIN — DEXTROSE AND SODIUM CHLORIDE: 5; .9 INJECTION, SOLUTION INTRAVENOUS at 08:02

## 2022-02-07 RX ADMIN — MICONAZOLE NITRATE: 2 OINTMENT TOPICAL at 08:02

## 2022-02-07 RX ADMIN — DEXTROSE AND SODIUM CHLORIDE: 5; .9 INJECTION, SOLUTION INTRAVENOUS at 09:02

## 2022-02-07 RX ADMIN — Medication 10 ML: at 01:02

## 2022-02-07 RX ADMIN — MICONAZOLE NITRATE: 2 OINTMENT TOPICAL at 09:02

## 2022-02-07 NOTE — SUBJECTIVE & OBJECTIVE
Review of Systems   Constitutional: Positive for activity change and appetite change. Negative for chills, diaphoresis, fatigue, fever and unexpected weight change.   HENT: Negative for congestion and dental problem.    Musculoskeletal: Positive for gait problem.   Skin: Positive for rash and wound.     Objective:     Vital Signs (Most Recent):  Temp: 98.1 °F (36.7 °C) (02/07/22 0845)  Pulse: 95 (02/07/22 1226)  Resp: 16 (02/07/22 0845)  BP: 125/71 (02/07/22 0845)  SpO2: 98 % (02/07/22 1226) Vital Signs (24h Range):  Temp:  [97.6 °F (36.4 °C)-98.9 °F (37.2 °C)] 98.1 °F (36.7 °C)  Pulse:  [73-95] 95  Resp:  [16-20] 16  SpO2:  [96 %-100 %] 98 %  BP: (125-135)/(60-71) 125/71     Weight: 49 kg (108 lb 0.4 oz)  Body mass index is 17.98 kg/m².    Intake/Output Summary (Last 24 hours) at 2/7/2022 1559  Last data filed at 2/7/2022 0800  Gross per 24 hour   Intake 118 ml   Output --   Net 118 ml      Physical Exam  Vitals and nursing note reviewed.   Constitutional:       General: She is awake.      Appearance: She is cachectic. She is ill-appearing.   HENT:      Right Ear: Tympanic membrane normal.   Eyes:      Pupils: Pupils are equal, round, and reactive to light.   Cardiovascular:      Rate and Rhythm: Normal rate.   Pulmonary:      Effort: Pulmonary effort is normal. No respiratory distress.      Breath sounds: No stridor. No wheezing, rhonchi or rales.   Chest:      Chest wall: No tenderness.   Abdominal:      General: There is no distension.      Palpations: There is no mass.      Tenderness: There is no abdominal tenderness. There is no right CVA tenderness, left CVA tenderness, guarding or rebound.      Hernia: No hernia is present.   Musculoskeletal:      Cervical back: Normal range of motion.   Skin:     General: Skin is warm.             Comments: Right foot with ace wrap dressing inplace  Stage 3 pressure ulcers to bilateral buttocks   Lesions to outer labia, medardo area        Neurological:      Mental  Status: She is alert. She is confused.         Significant Labs:   All pertinent labs within the past 24 hours have been reviewed.  BMP:   Recent Labs   Lab 02/06/22  1232   *   *   K 3.6   CL 97   CO2 23   BUN 30*   CREATININE 1.4   CALCIUM 8.6*     CBC: No results for input(s): WBC, HGB, HCT, PLT in the last 48 hours.  CMP:   Recent Labs   Lab 02/06/22  1232   *   K 3.6   CL 97   CO2 23   *   BUN 30*   CREATININE 1.4   CALCIUM 8.6*   ANIONGAP 11   EGFRNONAA 33*       Significant Imaging: I have reviewed all pertinent imaging results/findings within the past 24 hours.

## 2022-02-07 NOTE — PT/OT/SLP PROGRESS
Occupational Therapy   Treatment    Name: Tessie Champion  MRN: [id number]  Admitting Diagnosis:  Acute renal failure       Recommendations:     Discharge Recommendations: nursing facility, skilled  Discharge Equipment Recommendations:  bedside commode,bath bench,walker, rolling  Barriers to discharge:  Decreased caregiver support    Assessment:     Tessie Champion is a 89 y.o. female with a medical diagnosis of Acute renal failure.  She presents with the following performance deficits affecting function are weakness,impaired cognition,impaired skin,impaired endurance,decreased safety awareness,impaired self care skills,pain,impaired functional mobilty,impaired cardiopulmonary response to activity,impaired balance.     Rehab Prognosis:  Fair; patient would benefit from acute skilled OT services to address these deficits and reach maximum level of function.       Plan:     Patient to be seen 2 x/week to address the above listed problems via self-care/home management,therapeutic activities,therapeutic exercises  · Plan of Care Expires: 02/17/22  · Plan of Care Reviewed with: patient    Subjective     Pain/Comfort:  Pain Rating 1: 8/10  Location - Side 1: Right  Location 1: leg  Pain Addressed 1:  (activity pacing)    Objective:     Communicated with: NurseEloise, prior to session.  Patient found up in chair with telemetry,peripheral IV (óscar sys) upon OT entry to room.    General Precautions: Standard, fall   Orthopedic Precautions:N/A   Braces: N/A  Respiratory Status: Room air    Bed Mobility:    · Patient completed Rolling/Turning to Left with  maximal assistance and with side rail  · Patient completed Rolling/Turning to Right with maximal assistance and with side rail  · Patient completed Sit to Supine with total assistance of 2     Functional Mobility/Transfers:  · Patient completed Sit <> Stand Transfer with total assistance and of 2 persons  with  no assistive device   · Patient completed Bed <> Chair  Transfer using Squat Pivot technique with total assistance and of 2 persons with no assistive device  · Functional Mobility: unable    Activities of Daily Living:  Toileting: total assistance completed in supine . Due to cognitive deficits and completion in supine patient unable to engage in completion.    [facility] 6 Click ADL: 10    Treatment & Education:  Patient received in chair. Fatigue from OOB in chair 3 hours and voicing desire to return to supine. Patient unable to pressure relieve, therefore, appropriate for return to supine. Noted to have large liquid BM at initiation of transfer and patient states she did not tell staff it was there. Remains confused. Completed total A of 2 transfer back to bed and transitioned to supine. Rolled B with max A for toileting hygiene, pad exchange, and gown change. Applied additional barrier cream for improved skin. Educated on need for increased pressure offloading in supine to address skin. Limited comprehension of education. PCT informed of liquid BM and return to supine.    Patient left supine with all lines intact, call button in reach, bed alarm on and PCT and nurse notifiedEducation:  .    Returned SCD and B pressure relief boots.    GOALS:   Multidisciplinary Problems     Occupational Therapy Goals        Problem: Occupational Therapy Goal    Goal Priority Disciplines Outcome Interventions   Occupational Therapy Goal     OT, PT/OT Ongoing, Progressing    Description: Goals to be met by: 2/17/22     Patient will increase functional independence with ADLs by performing:    UE Dressing with Set-up Assistance.  Toileting from bedside commode with Stand-by Assistance for hygiene and clothing management.   Toilet transfer to bedside commode with Stand-by Assistance.  Increased functional strength in B UE grossly by 1/2 MM grade.                     Time Tracking:     OT Date of Treatment: 02/07/22  OT Start Time: 1135  OT Stop Time: 1200  OT Total Time (min): 25  min    Billable Minutes:Self Care/Home Management 10  Therapeutic Activity 15    2/7/2022

## 2022-02-07 NOTE — PLAN OF CARE
Pt remains fall free this shift.  Pt AAOx1, person only, verbal, clear speech.  Skin warm and dry. No new skin issues.  Telemonitoring in progress, 70s-80s SR  Room air  IV fluids  Assist x1 with transfers.  CBG q 6 hrs with PRN SS insulin.   Bed low, side rails up x 2, wheels locked, call light in reach.   Bed alarm maintained for safety.   Patient instructed to call for assistance.   Hourly rounding completed.   24 hour chart check completed  POC updated and reviewed with pt. Will continue POC.

## 2022-02-07 NOTE — PT/OT/SLP PROGRESS
Physical Therapy  Treatment    Tessie Champion   MRN: [id number]   Admitting Diagnosis: Acute renal failure    PT Received On: 02/07/22  PT Start Time: 0730     PT Stop Time: 0753    PT Total Time (min): 23 min       Billable Minutes:  Therapeutic Activity 23    Treatment Type: Treatment  PT/PTA: PT     PTA Visit Number: 0       General Precautions: Standard, fall  Orthopedic Precautions: N/A   Braces: N/A  Respiratory Status: Room air    Subjective:  Communicated with NURSE GE prior to session.  Pain/Comfort  Pain Rating 1: 8/10  Location - Side 1: Right  Location - Orientation 1: generalized  Location 1: leg  Pain Addressed 1: Reposition,Distraction,Cessation of Activity    Objective:   Patient found with: telemetry,peripheral IV    Functional Mobility:  Therapeutic Activities and Exercises:  PT FOUND SUPINE IN BED UPON ARRIVAL, AGREEABLE TO TX., C/O BEING COLD, SUP>SIT WITH MAXA X 2, SEATED SCOOT TO EOB WITH MAXA AND EXTRA TIME, P SITTING BALANCE DUE TO GENERALIZED WEAKNESS, REVIEW RW USE AND SAFETY DURING TF'S AND GAIT, SIT>STAND WITH MAXA X 2 , PT PERFORMED STAND PIVOT TF FROM BED TO CHAIR WITH MAXA X 2, CUES FOR UPRIGHT POSTURE DUE TO FLEXED FWD OVER RW, DECREASED WB TO RLE DUE TO C/O PAIN, P- DYNAMIC BALANCE, REVIEW BLE THEREX TO PERFORM WHILE SEATED IN CHAIR: HIP FLEX/EXT, LAQ, AP'S.  PT ENCOURAGED TO INCREASE TIME OOB IN CHAIR, PT SET UP FOR BREAKFAST    AM-PAC 6 CLICK MOBILITY  How much help from another person does this patient currently need?   1 = Unable, Total/Dependent Assistance  2 = A lot, Maximum/Moderate Assistance  3 = A little, Minimum/Contact Guard/Supervision  4 = None, Modified [place]/Independent    Turning over in bed (including adjusting bedclothes, sheets and blankets)?: 2  Sitting down on and standing up from a chair with arms (e.g., wheelchair, bedside commode, etc.): 2  Moving from lying on back to sitting on the side of the bed?: 2  Moving to and from a bed to a chair  (including a wheelchair)?: 2  Need to walk in hospital room?: 1  Climbing 3-5 steps with a railing?: 1  Basic Mobility Total Score: 10    AM-PAC Raw Score CMS G-Code Modifier Level of Impairment Assistance   6 % Total / Unable   7 - 9 CM 80 - 100% Maximal Assist   10 - 14 CL 60 - 80% Moderate Assist   15 - 19 CK 40 - 60% Moderate Assist   20 - 22 CJ 20 - 40% Minimal Assist   23 CI 1-20% SBA / CGA   24 CH 0% Independent/ Mod I     Patient left up in chair with all lines intact, call button in reach and NURSE notified.    Assessment:  Tessie Champion is a 89 y.o. female with a medical diagnosis of Acute renal failure and presents with IMPAIRED FUNCTIONAL MOBILITY. PT WILL BENEFIT FROM CONT. SKILLED P.T. TO ADDRESS IMPAIRMENTS    Rehab identified problem list/impairments: Rehab identified problem list/impairments: weakness,impaired endurance,gait instability,decreased safety awareness,impaired balance,pain,decreased lower extremity function    Rehab potential is fair.    Activity tolerance: Fair    Discharge recommendations: Discharge Facility/Level of Care Needs: nursing facility, skilled     Barriers to discharge:      Equipment recommendations: Equipment Needed After Discharge: bedside commode,bath bench,walker, rolling     GOALS:   Multidisciplinary Problems     Physical Therapy Goals        Problem: Physical Therapy Goal    Goal Priority Disciplines Outcome Goal Variances Interventions   Physical Therapy Goal     PT, PT/OT Ongoing, Progressing     Description: LTG'S TO BE MET IN 14 DAYS (2-17-22)  1. PT WILL REQUIRE [facility] FOR BED MOBILITY  2. PT WILL REQUIRE [facility] FOR TF'S  3. PT WILL ' WITH RW AND INO                   PLAN:    Patient to be seen 3 x/week  to address the above listed problems via gait training,therapeutic activities,therapeutic exercises  Plan of Care expires: 02/17/22  Plan of Care reviewed with: patient,daughter    02/07/2022

## 2022-02-07 NOTE — PT/OT/SLP PROGRESS
"Physical Therapy  Treatment    Tessie Champion   MRN: [id number]   Admitting Diagnosis: Acute renal failure    PT Received On: 02/07/22  PT Start Time: 1135     PT Stop Time: 1200    PT Total Time (min): 25 min       Billable Minutes:  Therapeutic Activity 25    Treatment Type: Treatment  PT/PTA: PT     PTA Visit Number: 0       General Precautions: Standard, fall  Orthopedic Precautions: N/A   Braces: N/A  Respiratory Status: Room air    Subjective:  Communicated with NURSE GE prior to session.  Pain/Comfort  Pain Rating 1: 8/10  Location - Side 1: Right  Location - Orientation 1: generalized  Location 1: leg  Pain Addressed 1: Reposition,Distraction,Cessation of Activity    Objective:   Patient found with: telemetry,peripheral IV    Functional Mobility:  Therapeutic Activities and Exercises:  PT FOUND STILL SITTING IN CHAIR SINCE EARLY THIS AM, APPEARS TO BE FATIGUED SO ASKED PT IF READY TO RETURN TO BED, SHE REPORTS "YES PLEASE".  PT FOUND TO BE SOILED IN LIQUID STOOL IN CHAIR, PT REQUIRED TOTAL ASSIST X 2 FOR TF CHAIR TO BED NO AD, P DYNAMIC BALANCE, PT REQUIRED TOTAL ASSIST FOR LATERAL ROLLING, TOTAL ASSIST FOR CLEANING FRONT AND BOTTOM, CHANGING SOILED GOWN AND PADS, BARRIER CREAM APPLIED AS NEEDED, PT LEFT WITH ALL NEEDS MET    AM-PAC 6 CLICK MOBILITY  How much help from another person does this patient currently need?   1 = Unable, Total/Dependent Assistance  2 = A lot, Maximum/Moderate Assistance  3 = A little, Minimum/Contact Guard/Supervision  4 = None, Modified [place]/Independent    Turning over in bed (including adjusting bedclothes, sheets and blankets)?: 2  Sitting down on and standing up from a chair with arms (e.g., wheelchair, bedside commode, etc.): 2  Moving from lying on back to sitting on the side of the bed?: 2  Moving to and from a bed to a chair (including a wheelchair)?: 2  Need to walk in hospital room?: 1  Climbing 3-5 steps with a railing?: 1  Basic Mobility Total Score: " 10    AM-PAC Raw Score CMS G-Code Modifier Level of Impairment Assistance   6 % Total / Unable   7 - 9 CM 80 - 100% Maximal Assist   10 - 14 CL 60 - 80% Moderate Assist   15 - 19 CK 40 - 60% Moderate Assist   20 - 22 CJ 20 - 40% Minimal Assist   23 CI 1-20% SBA / CGA   24 CH 0% Independent/ Mod I     Patient left HOB elevated with all lines intact, call button in reach, bed alarm on and NURSE AND AIDE notified.    Assessment:  Tessie Champion is a 89 y.o. female with a medical diagnosis of Acute renal failure and presents with IMPAIRED FUNCTIONAL MOBILITY. PT WILL BENEFIT FROM CONT. SKILLED P.T. TO ADDRESS IMPAIRMENTS    Rehab identified problem list/impairments: Rehab identified problem list/impairments: weakness,impaired endurance,gait instability,decreased safety awareness,impaired balance,pain,decreased lower extremity function    Rehab potential is fair.    Activity tolerance: Fair    Discharge recommendations: Discharge Facility/Level of Care Needs: nursing facility, skilled     Barriers to discharge:      Equipment recommendations: Equipment Needed After Discharge: bedside commode,bath bench,walker, rolling     GOALS:   Multidisciplinary Problems     Physical Therapy Goals        Problem: Physical Therapy Goal    Goal Priority Disciplines Outcome Goal Variances Interventions   Physical Therapy Goal     PT, PT/OT Ongoing, Progressing     Description: LTG'S TO BE MET IN 14 DAYS (2-17-22)  1. PT WILL REQUIRE [facility] FOR BED MOBILITY  2. PT WILL REQUIRE [facility] FOR TF'S  3. PT WILL ' WITH RW AND INO                   PLAN:    Patient to be seen 3 x/week  to address the above listed problems via gait training,therapeutic activities,therapeutic exercises  Plan of Care expires: 02/17/22  Plan of Care reviewed with: patient,daughter    02/07/2022

## 2022-02-07 NOTE — PLAN OF CARE
CM received a message from Deborah in regards to placement.  Deborah would like a referral made to [facility].  Choice form completed and referral made in [facility].

## 2022-02-07 NOTE — NURSING
Pt BS 32, pt asymptomatic  Gave pt apple juice and apple sauce  Current BS in the 60's  Pt eating lunch, will recheck once finish eating lunch

## 2022-02-07 NOTE — ASSESSMENT & PLAN NOTE
Will do renal ultrasound, gentle hydration, avoid nephrotoxic meds, follow CMP in AM     2/7/2022  Renal function stable

## 2022-02-07 NOTE — PLAN OF CARE
Discussed Plan of Care with patient and verbalized understanding - Patient remains awake and alert - remains free of falls, accidents and trauma during the day shift. Bed is in the low position and the call light is within reach. Patient more awake and alert today and eating better. Will continue to monitor

## 2022-02-07 NOTE — PLAN OF CARE
[facility]  Discharge Reassessment    Primary Care Provider: Kadeem Fleming MD    Expected Discharge Date:     Reassessment (most recent)     Discharge Reassessment - 02/07/22 1357        Discharge Reassessment    Assessment Type Discharge Planning Reassessment     Did the patient's condition or plan change since previous assessment? No     Discharge Plan discussed with: Caregiver     Name(s) and Number(s) Deborah Nascimento     Communicated JUVENAL with patient/caregiver Yes     Discharge Plan A Skilled Nursing Facility     Discharge Plan B Skilled Nursing Facility     DME Needed Upon Discharge  none     Discharge Barriers Identified None               CM spoke to patient's daughter in law in regards to placement.  [facility], and  [facility] are [facility] unable to accept patient.  Additional preferences obtained for [facility].  Choice form completed and referrals made in [facility].

## 2022-02-07 NOTE — PLAN OF CARE
Patient with increasing confusion and limited WB through R LE resulting in need for increased A with ADLs and transfers. Recommending SNF at d/c.   none

## 2022-02-07 NOTE — PT/OT/SLP PROGRESS
Occupational Therapy   Treatment    Name: Tessie Champion  MRN: [id number]  Admitting Diagnosis:  Acute renal failure      Recommendations:     Discharge Recommendations: nursing facility, skilled  Discharge Equipment Recommendations:  bedside commode,bath bench,walker, rolling  Barriers to discharge:  Decreased caregiver support    Assessment:     Tessie Champion is a 89 y.o. female with a medical diagnosis of Acute renal failure.  She presents with the following performance deficits affecting function are weakness,impaired endurance,impaired self care skills,impaired functional mobilty,impaired balance,impaired cognition,decreased safety awareness,pain,impaired cardiopulmonary response to activity,impaired skin.     Rehab Prognosis:  Fair; patient would benefit from acute skilled OT services to address these deficits and reach maximum level of function.       Plan:     Patient to be seen 2 x/week to address the above listed problems via self-care/home management,therapeutic activities,therapeutic exercises  · Plan of Care Expires: 02/17/22  · Plan of Care Reviewed with: patient    Subjective     Pain/Comfort:  · Pain Rating 1: 8/10  · Location - Side 1: Right  · Location 1: leg  · Pain Addressed 1:  (activity pacing)    Objective:     Communicated with: NurseEloise, prior to session.  Patient found supine with telemetry,peripheral IV,SCD,pressure relief boots, óscar sys upon OT entry to room.    General Precautions: Standard, fall   Orthopedic Precautions:N/A   Braces: N/A  Respiratory Status: Room air    Bed Mobility:    · Patient completed Supine to Sit with moderate assistance and with side rail     Functional Mobility/Transfers:  · Patient completed Sit <> Stand Transfer with maximal assistance and of 2 persons  with  rolling walker   · Patient completed Bed <> Chair Transfer using Stand Pivot technique with maximal assistance and of 2 persons with rolling walker  · Functional Mobility: unable    Activities  of Daily Living:  · Feeding:  setup from bedside chair with setup for container management. utensils with R hand.   · Noted to have impaired buttock skin. Completed simple toileting hygiene and applied barrier cream prior to transfer to chair to promote skin integrity     [facility] 6 Click ADL: 10    Treatment & Education:  Patient noted to have increased confusion from previous treatments. Patient with difficulty following commands to improve safety with transfers. Extremely fearful of falling resulting in rigidity and poor functional use of current strength. Patient requires increased time to engage in self feeding.    Patient left up in chair with all lines intact, call button in reach and nurse notifiedEducation:      GOALS:   Multidisciplinary Problems     Occupational Therapy Goals        Problem: Occupational Therapy Goal    Goal Priority Disciplines Outcome Interventions   Occupational Therapy Goal     OT, PT/OT Ongoing, Progressing    Description: Goals to be met by: 2/17/22     Patient will increase functional independence with ADLs by performing:    UE Dressing with Set-up Assistance.  Toileting from bedside commode with Stand-by Assistance for hygiene and clothing management.   Toilet transfer to bedside commode with Stand-by Assistance.  Increased functional strength in B UE grossly by 1/2 MM grade.                     Time Tracking:     OT Date of Treatment: 02/07/22  OT Start Time: 0745  OT Stop Time: 0810  OT Total Time (min): 25 min    Billable Minutes:Self Care/Home Management 10  Therapeutic Activity 15    2/7/2022

## 2022-02-07 NOTE — PLAN OF CARE
CM received a call from [facility] with EPS.  CM answered additional questions.

## 2022-02-07 NOTE — ASSESSMENT & PLAN NOTE
pt having hypoglycemic episode despite being on D5 NS infusion. She has continued to have PO intake and thus has been suffering from hypoglycemia. Will have discussion with POA regarding plan.
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
## 2022-02-08 PROBLEM — N17.9 ACUTE RENAL FAILURE: Status: RESOLVED | Noted: 2022-02-03 | Resolved: 2022-02-08

## 2022-02-08 LAB
ALBUMIN SERPL BCP-MCNC: 2.3 G/DL (ref 3.5–5.2)
POCT GLUCOSE: 101 MG/DL (ref 70–110)
POCT GLUCOSE: 102 MG/DL (ref 70–110)
POCT GLUCOSE: 90 MG/DL (ref 70–110)

## 2022-02-08 PROCEDURE — 36415 COLL VENOUS BLD VENIPUNCTURE: CPT | Performed by: NURSE PRACTITIONER

## 2022-02-08 PROCEDURE — 25000003 PHARM REV CODE 250: Performed by: INTERNAL MEDICINE

## 2022-02-08 PROCEDURE — 82040 ASSAY OF SERUM ALBUMIN: CPT | Performed by: NURSE PRACTITIONER

## 2022-02-08 PROCEDURE — 21400001 HC TELEMETRY ROOM

## 2022-02-08 PROCEDURE — 92526 ORAL FUNCTION THERAPY: CPT

## 2022-02-08 PROCEDURE — A4216 STERILE WATER/SALINE, 10 ML: HCPCS | Performed by: INTERNAL MEDICINE

## 2022-02-08 RX ADMIN — Medication 10 ML: at 10:02

## 2022-02-08 RX ADMIN — MICONAZOLE NITRATE: 2 OINTMENT TOPICAL at 08:02

## 2022-02-08 RX ADMIN — Medication 10 ML: at 05:02

## 2022-02-08 RX ADMIN — Medication 10 ML: at 02:02

## 2022-02-08 NOTE — PT/OT/SLP PROGRESS
Occupational Therapy Discharge      Patient Name:  Tessie Champion   MRN:  [id number]    OT discharge orders received via Jennifer DOBSON as patient with change in goals of care.    Carolina Lucia, OT    2/8/2022   1003

## 2022-02-08 NOTE — PT/OT/SLP PROGRESS
Speech Language Pathology Treatment    Patient Name:  Tessie Champion   MRN:  [id number]  Admitting Diagnosis: Acute renal failure    Recommendations:                 General Recommendations:  Dysphagia therapy  Diet recommendations:  Puree, Liquid Diet Level: Nectar Thick   Aspiration Precautions: 1 bite/sip at a time, Assistance with meals and Assistance with thickening liquids, Eliminate distractions, Feed only when awake/alert, HOB to 90 degrees, Meds crushed in puree and Remain upright 30 minutes post meal   General Precautions: Standard, aspiration,fall  Communication strategies:  none    Subjective     Pt was seen lying in bed with no family present.  She is awake, alert, and cooperative with her breakfast tray untouched.  PCT at bedside reported that pt refused breakfast this morning and has a poor appetite.    Patient goals: none stated      Pain/Comfort:  · Pain Rating 1: 0/10    Respiratory Status: Room air    Objective:     Has the patient been evaluated by SLP for swallowing?   Yes  Keep patient NPO? No     Swallowing assessment completed with pt exhibiting excessive chewing and swallow delay with trial of soft solids, decreased laryngeal elevation to palpation with intake of nectar thick liquids.  No overt signs of aspiration observed however pt remains at increased risk 2/2 weakness, decreased coordiantion, and decreased cognitive status.  ST recommends to continue current diet of puree with nectar thick liquids.      Assessment:     Tessie Champion is a 89 y.o. female with an SLP diagnosis of Dysphagia.  She presents with decreased swallow strength and function which increases her aspiration risk.  She will benefit from ongoing ST services for dysphagia therapy.  .    Goals:   Multidisciplinary Problems     SLP Goals        Problem: SLP Goal    Goal Priority Disciplines Outcome   SLP Goal     SLP Ongoing, Progressing   Description: TPW tolerate bedside trials for appropriate diet upgrade    TPW  tolerate the least restrictive diet without any overt s/s of aspiration                   Plan:     · Patient to be seen:  2 x/week   · Plan of Care expires:  02/15/22  · Plan of Care reviewed with:  patient   · SLP Follow-Up:  Yes         Time Tracking:     SLP Treatment Date:   02/08/22  Speech Start Time:  0937  Speech Stop Time:  0947     Speech Total Time (min):  10 min    Billable Minutes: Treatment Swallowing Dysfunction 10    02/08/2022

## 2022-02-08 NOTE — PT/OT/SLP PROGRESS
Physical Therapy      Patient Name:  Tessie Champion   MRN:  [id number]    ATTEMPTED P.T. TX. 2 TIMES THIS AM, PT NOT AVAILABLE, 1ST TIME PT SOILED WITH BOWEL IN BRIEF, 2ND TIME PT HAVING FAMILY MEETING WITH OLYA VERDUZCO, WILL ASSESS PT NEXT VISIT, CONT PER POC    Susan Velázquez, PT  2/8/2022  0750, 1100
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
## 2022-02-08 NOTE — PT/OT/SLP PROGRESS
Physical Therapy      Patient Name:  Tessie Champion   MRN:  [id number]    D/C PT FROM P.T. SERVICES PER OLYA VERDUZCO, PT TO D/C WITH HOSPICE CARE    Susan Velázquez, PT  2/8/2022  4491

## 2022-02-08 NOTE — PT/OT/SLP PROGRESS
Occupational Therapy      Patient Name:  Tessie Champion   MRN:  [id number]    OT presented to room at 0805 for treatment and PCT initiating cleaning as patient soiled. Returned at 1100 and NP present for family meeting re: goals of care. Will continue efforts.    Carolina Lucia, OT    2/8/2022   0805 & 1100

## 2022-02-08 NOTE — PLAN OF CARE
RAFIQ spoke to Yecenia with [facility].  The [facility] is unable to accept at this time secondary to wound care needs.  Yecenia is also working with [facility] and [facility] to determine if they can accept a hospice patient.  Yecenia will update RAFIQ tomorrow morning.

## 2022-02-08 NOTE — PLAN OF CARE
OLYA Rodriguez spoke to family about hospice care.  Family is agreeable to using hospice in a facility.  Family was agreeable to meeting with [facility].  Choice form completed and placed in the patient blue folder.

## 2022-02-08 NOTE — ASSESSMENT & PLAN NOTE
Will consult case management .  Need close follow up   I was unable to get the family to hear their side of the story .  Will follow in AM     2/8/22  -had lengthy discussion with family regarding hospice care.  Family is agreeable to using hospice in a facility. POA son and daughter-in-law has elected for [facility]. Awaiting for acceptance from NH.

## 2022-02-08 NOTE — PLAN OF CARE
Pt oriented to self VSS  Pt remained afebile throughout this shift  All meds administered per order.  Pt remianed free of falls  Plan of care reviewed. pt verbalizes understanding.  Patient moving/ turning Q2H.  Patient normal sinus on monitor  Telesitter at bedside  Bed low, side rails up x2, wheels locked, call light in reach.  Pt instructed to call for assistance

## 2022-02-08 NOTE — SUBJECTIVE & OBJECTIVE
Review of Systems   Constitutional: Positive for activity change and appetite change. Negative for chills, diaphoresis, fatigue, fever and unexpected weight change.   HENT: Negative for congestion and dental problem.    Musculoskeletal: Positive for gait problem.   Skin: Positive for rash and wound.     Objective:     Vital Signs (Most Recent):  Temp: 98 °F (36.7 °C) (02/08/22 1228)  Pulse: 85 (02/08/22 1228)  Resp: 18 (02/08/22 1228)  BP: (!) 83/51 (02/08/22 1228)  SpO2: 99 % (02/08/22 1228) Vital Signs (24h Range):  Temp:  [97.6 °F (36.4 °C)-98.5 °F (36.9 °C)] 98 °F (36.7 °C)  Pulse:  [77-99] 85  Resp:  [15-18] 18  SpO2:  [98 %-100 %] 99 %  BP: ()/(51-77) 83/51     Weight: 49 kg (108 lb 0.4 oz)  Body mass index is 17.98 kg/m².  No intake or output data in the 24 hours ending 02/08/22 1518   Physical Exam  Vitals and nursing note reviewed.   Constitutional:       General: She is awake.      Appearance: She is cachectic. She is ill-appearing.   HENT:      Right Ear: Tympanic membrane normal.   Eyes:      Pupils: Pupils are equal, round, and reactive to light.   Cardiovascular:      Rate and Rhythm: Normal rate.   Pulmonary:      Effort: Pulmonary effort is normal. No respiratory distress.      Breath sounds: No stridor. No wheezing, rhonchi or rales.   Chest:      Chest wall: No tenderness.   Abdominal:      General: There is no distension.      Palpations: There is no mass.      Tenderness: There is no abdominal tenderness. There is no right CVA tenderness, left CVA tenderness, guarding or rebound.      Hernia: No hernia is present.   Musculoskeletal:      Cervical back: Normal range of motion.   Skin:     General: Skin is warm.             Comments: Right foot with ace wrap dressing inplace  Stage 3 pressure ulcers to bilateral buttocks   Lesions to outer labia, medardo area        Neurological:      Mental Status: She is alert. She is confused.         Significant Labs:   All pertinent labs within the past  24 hours have been reviewed.  BMP: No results for input(s): GLU, NA, K, CL, CO2, BUN, CREATININE, CALCIUM, MG in the last 48 hours.  CBC: No results for input(s): WBC, HGB, HCT, PLT in the last 48 hours.  CMP:   Recent Labs   Lab 02/08/22  1422   ALBUMIN 2.3*       Significant Imaging: I have reviewed all pertinent imaging results/findings within the past 24 hours.

## 2022-02-08 NOTE — ASSESSMENT & PLAN NOTE
Please see pics in media, consult wound care .  Consult case management.  She will likely need placement      2/3/2022  Case management follow   Pt refuse placement  Reported to EPS    Continue local wound care

## 2022-02-09 VITALS
BODY MASS INDEX: 18.33 KG/M2 | OXYGEN SATURATION: 95 % | RESPIRATION RATE: 16 BRPM | HEIGHT: 65 IN | SYSTOLIC BLOOD PRESSURE: 126 MMHG | TEMPERATURE: 98 F | DIASTOLIC BLOOD PRESSURE: 72 MMHG | HEART RATE: 82 BPM | WEIGHT: 110 LBS

## 2022-02-09 PROBLEM — R23.9: Status: ACTIVE | Noted: 2022-02-09

## 2022-02-09 PROBLEM — F09 COGNITIVE DYSFUNCTION: Status: ACTIVE | Noted: 2022-02-09

## 2022-02-09 PROBLEM — E46 PROTEIN CALORIE MALNUTRITION: Status: ACTIVE | Noted: 2022-02-09

## 2022-02-09 PROBLEM — L89.42 PRESSURE INJURY OF CONTIGUOUS REGION INVOLVING BACK AND BUTTOCK, STAGE 2: Status: ACTIVE | Noted: 2022-02-09

## 2022-02-09 LAB
POCT GLUCOSE: 82 MG/DL (ref 70–110)
POCT GLUCOSE: 96 MG/DL (ref 70–110)
POCT GLUCOSE: 99 MG/DL (ref 70–110)
POCT GLUCOSE: 99 MG/DL (ref 70–110)

## 2022-02-09 PROCEDURE — A4216 STERILE WATER/SALINE, 10 ML: HCPCS | Performed by: INTERNAL MEDICINE

## 2022-02-09 PROCEDURE — 25000003 PHARM REV CODE 250: Performed by: INTERNAL MEDICINE

## 2022-02-09 PROCEDURE — 63600175 PHARM REV CODE 636 W HCPCS: Performed by: NURSE PRACTITIONER

## 2022-02-09 RX ORDER — LEVOTHYROXINE SODIUM 25 UG/1
25 TABLET ORAL
Qty: 30 TABLET | Refills: 11
Start: 2022-02-09 | End: 2023-02-09

## 2022-02-09 RX ADMIN — DEXTROSE AND SODIUM CHLORIDE: 5; .9 INJECTION, SOLUTION INTRAVENOUS at 03:02

## 2022-02-09 RX ADMIN — Medication 10 ML: at 05:02

## 2022-02-09 RX ADMIN — Medication 10 ML: at 03:02

## 2022-02-09 RX ADMIN — DEXTROSE AND SODIUM CHLORIDE: 5; .9 INJECTION, SOLUTION INTRAVENOUS at 01:02

## 2022-02-09 RX ADMIN — MICONAZOLE NITRATE: 2 OINTMENT TOPICAL at 08:02
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
## 2022-02-09 NOTE — PLAN OF CARE
RAFIQ spoke to Candice at [facility] and they will contact Deborah in regards to admission at [facility] with Hospice services.      RAFIQ spoke to Greyson at [facility] and report can be called to [phone] ([phone] nurse).      Once RAFIQ has confirmation of acceptance by [facility], [facility] and patient will transfer on drip.

## 2022-02-09 NOTE — PLAN OF CARE
[facility]      RAFIQ spoke to Maria Elena at [facility] and they have received the referral to review.

## 2022-02-09 NOTE — PLAN OF CARE
[facility] Med [facility]  Discharge Final Note    Primary Care Provider: Kadeem Fleming MD    Expected Discharge Date: 2/9/2022    Final Discharge Note (most recent)     Final Note - 02/09/22 1540        Final Note    Assessment Type Final Discharge Note     Anticipated Discharge Disposition Hospice/Medical Facility        Post-Acute Status    Post-Acute Authorization Placement;Hospice     Post-Acute Placement Status Set-up Complete/Auth obtained     Hospice Status Set-up Complete/Auth obtained                 Important Message from Medicare  Important Message from Medicare regarding Discharge Appeal Rights: Given to patient/caregiver,Explained to patient/caregiver,Signed/date by patient/caregiver     Date IMM was signed: 02/08/22  Time IMM was signed: 0909    Contact Info     [facility]    [address] [address]   Phone: [phone]       Next Steps: Follow up    [facility]    [address] [address]   Phone: [phone]       Next Steps: Follow up       [facility] to [facility] with IV D5NS

## 2022-02-09 NOTE — PLAN OF CARE
[facility] - Med S[facility] 3  Discharge Reassessment    Primary Care Provider: Kadeem Fleming MD    Expected Discharge Date:     Reassessment (most recent)     Discharge Reassessment - 02/09/22 0910        Discharge Reassessment    Assessment Type Discharge Planning Reassessment     Did the patient's condition or plan change since previous assessment? No     Discharge Plan A Inpatient Hospice   [facility] at a nursing facility    DME Needed Upon Discharge  none     Discharge Barriers Identified None               Patient has been accepted by [facility] for placement in a nursing facility.  [facility] is reviewing financial information with the family and will update on whether or not they can accept patient at [facility] under the care of [facility].

## 2022-02-09 NOTE — PLAN OF CARE
Pt pleasantly confused  VSS  Pt remained afebile throughout this shift  All meds administered per order.  Pt remianed free of falls  Patient moving/ turning Q2H.  Currently awaiting placement  Bed low, side rails up x2, wheels locked, call light in reach, bed alarm set, telesitter at bedside.  Pt instructed to call for assistance  Hourly rounding completed.

## 2022-02-09 NOTE — NURSING
Report given to OSCAR Leung at [facility].  Per Roxana  - D5NS drip to continue after patient discharges to prevent hypoglycemia enroute to [facility].

## 2022-02-09 NOTE — PLAN OF CARE
CM spoke to [facility] with [facility].  [facility] transferred to [facility].  CM will place PFC order for transport to .

## 2022-02-09 NOTE — PLAN OF CARE
RAFIQ spoke to Yecenia with [facility].  Family would like for patient to go to [facility] so services for hospice would have to be through [facility].      RAFIQ contacted Deborah and spoke to her about the discharge plan.  They do prefer that patient discharge to [facility] with [facility].  Choice form completed and referral made in careport to [facility].  Deborah stated that she is meeting with Greyson at  at 2:00 to complete all paperwork.     CM will obtain discharge orders for discharge to [facility] with [facility].

## 2022-02-09 NOTE — PLAN OF CARE
Patient's family is supposed to tour [facility] today at 1:00pm.  RAFIQ awaiting determination.    RAFIQ also received a call from Greyson with [facility].  They have agreed to look over the records in regards to placement with hospice.  Greyson did say that they can not use [facility] and family would have to agree to [facility].  Yecenia with [facility] will contact patient's family to check status.

## 2022-02-10 NOTE — NURSING
Rachel Martinez notified that [facility] will be late again because a second dispatch is needed.

## 2022-02-10 NOTE — NURSING
Mia mckeon [facility] notified that [facility] will  pt between 7-8pm.

## 2022-02-10 NOTE — NURSING
[facility] ambulance arrived to bedside to transport patient.  They did not have IV pump to continue D5NS drip.  Paramedic notified supervisor at [facility] to dispatch another unit with IV pump to  patient.  ETA 7-8pm.  ARIANA Goncalves charge nurse notified.

## 2022-02-10 NOTE — PHYSICIAN QUERY
"PT Name: Tessie Champion  MR #: [id number]    DOCUMENTATION CLARIFICATION     CDS/: Bryant Morales RN, CCDS               Contact information:   [email or web address]      This form is a permanent document in the medical record.     Query Date: February 10, 2022    By submitting this query, we are merely seeking further clarification of documentation.  Please utilize your independent clinical judgment when addressing the question(s) below.      The Medical Record reflects the following:  Clinical Information Location in Medical Record   Clinical Impression: NSTEMI    Elevated troponin-  -Troponin trending down;  Echo showed EF 65%, DD, Pulmonary HTN;Monitor     Final Active Diagnoses: Essential troponin  2/2  ED provider notes     2/4 Hospital medicine         Discharge Summary       2/2 troponin: 0.113  2/3 troponin:  0.096,  0.116    2/2  BNP: 142   Labs   "    "   Normal sinus rhythm   T wave abnormality, consider inferolateral ischemia   Abnormal ECG   When compared with ECG of 08-JAN-2020 11:36,   Premature atrial complexes are no longer Present   T wave inversion now evident in Inferior leads   T wave inversion now evident in Lateral leads   Confirmed by GELACIO NAVA MD ([id number]) on 2/3/2022 1:36:24 PM     ejection fraction is 65%.  Grade I left ventricular diastolic dysfunction.   2/2 EKG 12 lead                   2/3  TTE       Please clarify/confirm the Emergency Medicine diagnosis of     _NSTEMI__  ?     [   ] Diagnosis ruled in   [   ] Diagnosis ruled in, but it resolved prior to my assessment of the patient   [  x ] Diagnosis ruled out   [   ] Other diagnosis (please specify): _____________   [  ] Clinically undetermined       "

## 2022-10-31 NOTE — ASSESSMENT & PLAN NOTE
Will consult case management .  Need close follow up   I was unable to get the family to hear their side of the story .  Will follow in AM     2/4/2022  Case management following    Complex Repair And A-T Advancement Flap Text: The defect edges were debeveled with a #15 scalpel blade.  The primary defect was closed partially with a complex linear closure.  Given the location of the remaining defect, shape of the defect and the proximity to free margins an A-T advancement flap was deemed most appropriate for complete closure of the defect.  Using a sterile surgical marker, an appropriate advancement flap was drawn incorporating the defect and placing the expected incisions within the relaxed skin tension lines where possible.    The area thus outlined was incised deep to adipose tissue with a #15 scalpel blade.  The skin margins were undermined to an appropriate distance in all directions utilizing iris scissors.

## 2023-10-28 NOTE — NURSING
Pt arrived via stretcher and transferred to bed. VS taken. Tele monitor placed. Pt has multiple wounds, that were dressed with foam dressings. NS infusing at 75 mL/hr. Waffle mattress and heel boots placed. Bed alarm on. Bed low and wheels locked, side rails up x3, call light in reach. Instructed to call for assistance.    show